# Patient Record
Sex: FEMALE | Race: WHITE | NOT HISPANIC OR LATINO | Employment: FULL TIME | URBAN - METROPOLITAN AREA
[De-identification: names, ages, dates, MRNs, and addresses within clinical notes are randomized per-mention and may not be internally consistent; named-entity substitution may affect disease eponyms.]

---

## 2019-06-21 LAB — PAP RECOMMENDATION EXT: NORMAL

## 2022-01-28 ENCOUNTER — TELEPHONE (OUTPATIENT)
Dept: RHEUMATOLOGY | Facility: CLINIC | Age: 27
End: 2022-01-28
Payer: MEDICARE

## 2022-01-28 NOTE — TELEPHONE ENCOUNTER
----- Message from Chana Fournier sent at 1/28/2022  4:14 PM CST -----  Contact: pt mother  Type: Sooner Appt Request    Caller is requesting a sooner appt.  Caller declined first available listed below.  Caller will not accept being placed on the wait list and are requesting a message be sent to the doctor.    Name of Caller:pt  mother     When is the 1st available appt:none     Symptoms:Suspected lupus     Best Call Back #: [phone]      Additional Info Pt mother states her Cardiologist wants her to make a appt with Dr Crews for suspected Lupus Please advise-Thank you~

## 2022-04-04 ENCOUNTER — TELEPHONE (OUTPATIENT)
Dept: RHEUMATOLOGY | Facility: CLINIC | Age: 27
End: 2022-04-04
Payer: MEDICAID

## 2022-04-04 NOTE — TELEPHONE ENCOUNTER
Called patients mom gave 1st available appointment patient placed on wait list.  ----- Message from Archie Núñez sent at 4/4/2022 11:13 AM CDT -----  Regarding: appointment  Contact: Mom, Felicitas Polk said she left message regarding appointment, patient schedule is flexible, please call back at [phone]     Case number [id number]

## 2022-06-07 ENCOUNTER — LAB VISIT (OUTPATIENT)
Dept: LAB | Facility: HOSPITAL | Age: 27
End: 2022-06-07
Attending: INTERNAL MEDICINE
Payer: COMMERCIAL

## 2022-06-07 ENCOUNTER — OFFICE VISIT (OUTPATIENT)
Dept: RHEUMATOLOGY | Facility: CLINIC | Age: 27
End: 2022-06-07
Payer: COMMERCIAL

## 2022-06-07 VITALS
WEIGHT: 190.69 LBS | HEART RATE: 74 BPM | SYSTOLIC BLOOD PRESSURE: 145 MMHG | BODY MASS INDEX: 29.93 KG/M2 | HEIGHT: 67 IN | DIASTOLIC BLOOD PRESSURE: 90 MMHG

## 2022-06-07 DIAGNOSIS — R21 RASH AND NONSPECIFIC SKIN ERUPTION: Primary | ICD-10-CM

## 2022-06-07 DIAGNOSIS — R21 RASH AND NONSPECIFIC SKIN ERUPTION: ICD-10-CM

## 2022-06-07 DIAGNOSIS — Z71.89 COUNSELING ON HEALTH PROMOTION AND DISEASE PREVENTION: ICD-10-CM

## 2022-06-07 DIAGNOSIS — G89.4 CHRONIC PAIN SYNDROME: ICD-10-CM

## 2022-06-07 LAB
ALBUMIN SERPL BCP-MCNC: 4.5 G/DL (ref 3.5–5.2)
ALP SERPL-CCNC: 63 U/L (ref 55–135)
ALT SERPL W/O P-5'-P-CCNC: 22 U/L (ref 10–44)
ANION GAP SERPL CALC-SCNC: 13 MMOL/L (ref 8–16)
AST SERPL-CCNC: 18 U/L (ref 10–40)
BASOPHILS # BLD AUTO: 0.01 K/UL (ref 0–0.2)
BASOPHILS NFR BLD: 0.2 % (ref 0–1.9)
BILIRUB SERPL-MCNC: 0.7 MG/DL (ref 0.1–1)
BILIRUB UR QL STRIP: NEGATIVE
BUN SERPL-MCNC: 14 MG/DL (ref 6–20)
C3 SERPL-MCNC: 135 MG/DL (ref 50–180)
C4 SERPL-MCNC: 19 MG/DL (ref 11–44)
CALCIUM SERPL-MCNC: 9.7 MG/DL (ref 8.7–10.5)
CHLORIDE SERPL-SCNC: 106 MMOL/L (ref 95–110)
CK SERPL-CCNC: 81 U/L (ref 20–180)
CLARITY UR: CLEAR
CO2 SERPL-SCNC: 22 MMOL/L (ref 23–29)
COLOR UR: YELLOW
CREAT SERPL-MCNC: 0.8 MG/DL (ref 0.5–1.4)
CREAT UR-MCNC: 134.5 MG/DL (ref 15–325)
CRP SERPL-MCNC: 2.5 MG/L (ref 0–8.2)
DIFFERENTIAL METHOD: NORMAL
EOSINOPHIL # BLD AUTO: 0.1 K/UL (ref 0–0.5)
EOSINOPHIL NFR BLD: 1.6 % (ref 0–8)
ERYTHROCYTE [DISTWIDTH] IN BLOOD BY AUTOMATED COUNT: 12.5 % (ref 11.5–14.5)
ERYTHROCYTE [SEDIMENTATION RATE] IN BLOOD BY WESTERGREN METHOD: 11 MM/HR (ref 0–20)
EST. GFR  (AFRICAN AMERICAN): >60 ML/MIN/1.73 M^2
EST. GFR  (NON AFRICAN AMERICAN): >60 ML/MIN/1.73 M^2
GLUCOSE SERPL-MCNC: 80 MG/DL (ref 70–110)
GLUCOSE UR QL STRIP: NEGATIVE
HCT VFR BLD AUTO: 40.9 % (ref 37–48.5)
HGB BLD-MCNC: 13.8 G/DL (ref 12–16)
HGB UR QL STRIP: NEGATIVE
IMM GRANULOCYTES # BLD AUTO: 0.02 K/UL (ref 0–0.04)
IMM GRANULOCYTES NFR BLD AUTO: 0.4 % (ref 0–0.5)
KETONES UR QL STRIP: NEGATIVE
LEUKOCYTE ESTERASE UR QL STRIP: NEGATIVE
LYMPHOCYTES # BLD AUTO: 2.1 K/UL (ref 1–4.8)
LYMPHOCYTES NFR BLD: 41.1 % (ref 18–48)
MCH RBC QN AUTO: 29.7 PG (ref 27–31)
MCHC RBC AUTO-ENTMCNC: 33.7 G/DL (ref 32–36)
MCV RBC AUTO: 88 FL (ref 82–98)
MONOCYTES # BLD AUTO: 0.4 K/UL (ref 0.3–1)
MONOCYTES NFR BLD: 7 % (ref 4–15)
NEUTROPHILS # BLD AUTO: 2.6 K/UL (ref 1.8–7.7)
NEUTROPHILS NFR BLD: 49.7 % (ref 38–73)
NITRITE UR QL STRIP: NEGATIVE
NRBC BLD-RTO: 0 /100 WBC
PH UR STRIP: 6 [PH] (ref 5–8)
PLATELET # BLD AUTO: 211 K/UL (ref 150–450)
PMV BLD AUTO: 10.7 FL (ref 9.2–12.9)
POTASSIUM SERPL-SCNC: 4 MMOL/L (ref 3.5–5.1)
PROT SERPL-MCNC: 7.8 G/DL (ref 6–8.4)
PROT UR QL STRIP: NEGATIVE
PROT UR-MCNC: 11 MG/DL (ref 0–15)
PROT/CREAT UR: 0.08 MG/G{CREAT} (ref 0–0.2)
RBC # BLD AUTO: 4.64 M/UL (ref 4–5.4)
SODIUM SERPL-SCNC: 141 MMOL/L (ref 136–145)
SP GR UR STRIP: 1.02 (ref 1–1.03)
URN SPEC COLLECT METH UR: NORMAL
WBC # BLD AUTO: 5.14 K/UL (ref 3.9–12.7)

## 2022-06-07 PROCEDURE — 86140 C-REACTIVE PROTEIN: CPT | Performed by: INTERNAL MEDICINE

## 2022-06-07 PROCEDURE — 81003 URINALYSIS AUTO W/O SCOPE: CPT | Performed by: INTERNAL MEDICINE

## 2022-06-07 PROCEDURE — 99999 PR PBB SHADOW E&M-EST. PATIENT-LVL III: ICD-10-PCS | Mod: PBBFAC,,, | Performed by: INTERNAL MEDICINE

## 2022-06-07 PROCEDURE — 86146 BETA-2 GLYCOPROTEIN ANTIBODY: CPT | Performed by: INTERNAL MEDICINE

## 2022-06-07 PROCEDURE — 86225 DNA ANTIBODY NATIVE: CPT | Performed by: INTERNAL MEDICINE

## 2022-06-07 PROCEDURE — 99204 PR OFFICE/OUTPT VISIT, NEW, LEVL IV, 45-59 MIN: ICD-10-PCS | Mod: ICN,S$GLB,, | Performed by: INTERNAL MEDICINE

## 2022-06-07 PROCEDURE — 82570 ASSAY OF URINE CREATININE: CPT | Performed by: INTERNAL MEDICINE

## 2022-06-07 PROCEDURE — 82550 ASSAY OF CK (CPK): CPT | Performed by: INTERNAL MEDICINE

## 2022-06-07 PROCEDURE — 83516 IMMUNOASSAY NONANTIBODY: CPT | Mod: 59 | Performed by: INTERNAL MEDICINE

## 2022-06-07 PROCEDURE — 36415 COLL VENOUS BLD VENIPUNCTURE: CPT | Mod: PO | Performed by: INTERNAL MEDICINE

## 2022-06-07 PROCEDURE — 86160 COMPLEMENT ANTIGEN: CPT | Mod: 59 | Performed by: INTERNAL MEDICINE

## 2022-06-07 PROCEDURE — 86038 ANTINUCLEAR ANTIBODIES: CPT | Performed by: INTERNAL MEDICINE

## 2022-06-07 PROCEDURE — 85651 RBC SED RATE NONAUTOMATED: CPT | Performed by: INTERNAL MEDICINE

## 2022-06-07 PROCEDURE — 99204 OFFICE O/P NEW MOD 45 MIN: CPT | Mod: ICN,S$GLB,, | Performed by: INTERNAL MEDICINE

## 2022-06-07 PROCEDURE — 86431 RHEUMATOID FACTOR QUANT: CPT | Performed by: INTERNAL MEDICINE

## 2022-06-07 PROCEDURE — 86147 CARDIOLIPIN ANTIBODY EA IG: CPT | Mod: 59 | Performed by: INTERNAL MEDICINE

## 2022-06-07 PROCEDURE — 86235 NUCLEAR ANTIGEN ANTIBODY: CPT | Mod: 59 | Performed by: INTERNAL MEDICINE

## 2022-06-07 PROCEDURE — 99999 PR PBB SHADOW E&M-EST. PATIENT-LVL III: CPT | Mod: PBBFAC,,, | Performed by: INTERNAL MEDICINE

## 2022-06-07 PROCEDURE — 85025 COMPLETE CBC W/AUTO DIFF WBC: CPT | Performed by: INTERNAL MEDICINE

## 2022-06-07 PROCEDURE — 86200 CCP ANTIBODY: CPT | Performed by: INTERNAL MEDICINE

## 2022-06-07 PROCEDURE — 99213 OFFICE O/P EST LOW 20 MIN: CPT | Mod: PBBFAC,PO | Performed by: INTERNAL MEDICINE

## 2022-06-07 PROCEDURE — 80053 COMPREHEN METABOLIC PANEL: CPT | Performed by: INTERNAL MEDICINE

## 2022-06-07 PROCEDURE — 86160 COMPLEMENT ANTIGEN: CPT | Performed by: INTERNAL MEDICINE

## 2022-06-07 PROCEDURE — 85610 PROTHROMBIN TIME: CPT | Performed by: INTERNAL MEDICINE

## 2022-06-07 RX ORDER — HYDROXYCHLOROQUINE SULFATE 200 MG/1
200 TABLET, FILM COATED ORAL 2 TIMES DAILY
Qty: 60 TABLET | Refills: 3 | Status: SHIPPED | OUTPATIENT
Start: 2022-06-07 | End: 2022-07-19

## 2022-06-07 NOTE — PROGRESS NOTES
RHEUMATOLOGY OUTPATIENT CLINIC NOTE    6/7/2022    Attending Rheumatologist: Emerson Polanco  Primary Care Provider/Physician Requesting Consultation: Alfred Burgess MD   Chief Complaint/Reason For Consultation:  Possible Lupus      Subjective:     Brennan Christine is a 27 y.o. White female with intermittent hive like lesions referred for rheumatic evaluation.    Main concern of chronic arthralgias, intermittent rashes, and dyspnea on exertion.    Review of Systems   Constitutional: Positive for malaise/fatigue. Negative for fever.   HENT:        Chronic intermittent pain full oral mucosa ulcerations.  Moderate sicca symptoms   Eyes:        No history uveitis   Respiratory: Negative for cough, hemoptysis and shortness of breath (Refers chronic dyspnea on exertion).    Gastrointestinal: Negative for blood in stool, heartburn and melena.        Denies dysphagia.   Genitourinary:        Denies ulcerative lesions   Musculoskeletal: Positive for joint pain and myalgias.   Skin: Negative for rash.        Denies personal or family history of psoriasis (immediate family).  Denies Raynaud's phenomena.  Denies pathergy phenomena.  Intermittent erythematous plaque on sun-exposed areas (pictures provided by patient) that last over 24 hours, more itching done burning, heal without a scar.   Neurological: Positive for headaches. Negative for focal weakness.   Endo/Heme/Allergies:        History of superficial venous thrombosis in setting of venous malformation left upper extremity.  Denies ever being pregnant.       Chronic comorbid conditions affecting medical decision making today:  Past Medical History:   Diagnosis Date    Chronic back pain     Venous malformation     Left Arm     Past Surgical History:   Procedure Laterality Date    SCLEROTHERAPY WITH ULTRASOUND GUIDANCE      TONSILLECTOMY       Family History   Problem Relation Age of Onset    Hypertension Mother     Diabetes Mellitus Father     Hypertension Maternal  Grandmother     Hypertension Maternal Grandfather     Coronary artery disease Maternal Grandfather     Kidney disease Maternal Grandfather      Social History     Substance and Sexual Activity   Alcohol Use No     Social History     Tobacco Use   Smoking Status Current Every Day Smoker    Packs/day: 0.50    Types: Cigarettes   Smokeless Tobacco Not on file     Social History     Substance and Sexual Activity   Drug Use No     No current outpatient medications on file.     Objective:     Vitals:    06/07/22 0806   BP: (!) 145/90   Pulse: 74     Physical Exam   Eyes: Conjunctivae are normal.   Pulmonary/Chest: Effort normal. No respiratory distress.   Musculoskeletal:         General: No swelling. Normal range of motion.   Neurological: She displays no weakness.   Skin: No rash noted.       Reviewed available old and all outside pertinent medical records available.    All lab results personally reviewed and interpreted by me.  Lab Results   Component Value Date    WBC 10.70 04/25/2016    HGB 13.9 04/25/2016    HCT 39.9 04/25/2016    MCV 91 04/25/2016    RDW 12.6 04/25/2016     04/25/2016       Lab Results   Component Value Date     04/25/2016    K 4.0 04/25/2016     04/25/2016    CO2 25 04/25/2016    GLU 83 04/25/2016    BUN 14 04/25/2016    CALCIUM 8.9 04/25/2016    PROT 7.0 04/25/2016    ALBUMIN 4.3 04/25/2016    BILITOT 0.4 04/25/2016    AST 22 04/25/2016    ALKPHOS 53 04/25/2016    ALT 30 04/25/2016       Lab Results   Component Value Date    COLORU Yellow 04/25/2016    APPEARANCEUA Hazy (A) 04/25/2016    SPECGRAV 1.025 04/25/2016    PHUR 6.0 04/25/2016    PROTEINUA Negative 04/25/2016    KETONESU Negative 04/25/2016    LEUKOCYTESUR 1+ (A) 04/25/2016    NITRITE Negative 04/25/2016    UROBILINOGEN Negative 04/25/2016       No results found for: PTH    No results found for: URICACID    No results found for: CRP, ERYTHROCYTES    No results found for: ANATITER    No components found for:  TSPOTTB,  QUANTIFERON     ASSESSMENT:     Chronic intermittent rash and nonspecific symptoms (fatigue, APPIAH, myofascial pain) referred for rheumatic evaluation with concern of systemic rheumatic autoimmune condition.  Consider undifferentiated connective tissue disease with features of early incomplete SLE, Sjogren syndrome, or dermatomyositis.  Recommend based rheumatic evaluation to determine presence of autoantibodies and related end organ damage.  For chronic intermittent rash on sun-exposed areas recommend trial of Plaquenil for 4-6 months.  No history of QT prolongation.  If no improvement, recommend follow-up with Dermatology to consider skin biopsy a determine etiology of rash.    PLAN:     1. Chronic intermittent photosensitive rash    2. Chronic pain syndrome    3. Other specified counseling      Disclaimer: This note is prepared using voice recognition software and as such is likely to have errors and has not been proof read. Please contact me for questions.         Emerson Polanco M.D.

## 2022-06-08 ENCOUNTER — OFFICE VISIT (OUTPATIENT)
Dept: FAMILY MEDICINE | Facility: CLINIC | Age: 27
End: 2022-06-08
Payer: COMMERCIAL

## 2022-06-08 ENCOUNTER — HOSPITAL ENCOUNTER (OUTPATIENT)
Dept: RADIOLOGY | Facility: CLINIC | Age: 27
Discharge: HOME | End: 2022-06-08
Attending: STUDENT IN AN ORGANIZED HEALTH CARE EDUCATION/TRAINING PROGRAM
Payer: COMMERCIAL

## 2022-06-08 VITALS
RESPIRATION RATE: 18 BRPM | HEART RATE: 72 BPM | HEIGHT: 67 IN | DIASTOLIC BLOOD PRESSURE: 74 MMHG | BODY MASS INDEX: 30.27 KG/M2 | SYSTOLIC BLOOD PRESSURE: 110 MMHG | WEIGHT: 192.88 LBS | OXYGEN SATURATION: 97 %

## 2022-06-08 DIAGNOSIS — R05.3 CHRONIC COUGH: ICD-10-CM

## 2022-06-08 DIAGNOSIS — E04.1 THYROID NODULE: ICD-10-CM

## 2022-06-08 DIAGNOSIS — Z13.220 ENCOUNTER FOR LIPID SCREENING FOR CARDIOVASCULAR DISEASE: ICD-10-CM

## 2022-06-08 DIAGNOSIS — Z13.6 ENCOUNTER FOR LIPID SCREENING FOR CARDIOVASCULAR DISEASE: ICD-10-CM

## 2022-06-08 DIAGNOSIS — Z00.00 ROUTINE GENERAL MEDICAL EXAMINATION AT A HEALTH CARE FACILITY: Primary | ICD-10-CM

## 2022-06-08 LAB
CCP AB SER IA-ACNC: <0.5 U/ML
DSDNA AB SER-ACNC: NORMAL [IU]/ML
RHEUMATOID FACT SERPL-ACNC: <13 IU/ML (ref 0–15)

## 2022-06-08 PROCEDURE — 71046 X-RAY EXAM CHEST 2 VIEWS: CPT | Mod: TC,FY,PO

## 2022-06-08 PROCEDURE — 99999 PR PBB SHADOW E&M-EST. PATIENT-LVL IV: ICD-10-PCS | Mod: PBBFAC,,, | Performed by: STUDENT IN AN ORGANIZED HEALTH CARE EDUCATION/TRAINING PROGRAM

## 2022-06-08 PROCEDURE — 99999 PR PBB SHADOW E&M-EST. PATIENT-LVL IV: CPT | Mod: PBBFAC,,, | Performed by: STUDENT IN AN ORGANIZED HEALTH CARE EDUCATION/TRAINING PROGRAM

## 2022-06-08 PROCEDURE — 99213 OFFICE O/P EST LOW 20 MIN: CPT | Mod: S$GLB,,, | Performed by: STUDENT IN AN ORGANIZED HEALTH CARE EDUCATION/TRAINING PROGRAM

## 2022-06-08 PROCEDURE — 99213 PR OFFICE/OUTPT VISIT, EST, LEVL III, 20-29 MIN: ICD-10-PCS | Mod: S$GLB,,, | Performed by: STUDENT IN AN ORGANIZED HEALTH CARE EDUCATION/TRAINING PROGRAM

## 2022-06-08 PROCEDURE — 99214 OFFICE O/P EST MOD 30 MIN: CPT | Mod: PBBFAC,25,PO | Performed by: STUDENT IN AN ORGANIZED HEALTH CARE EDUCATION/TRAINING PROGRAM

## 2022-06-08 PROCEDURE — 71046 XR CHEST PA AND LATERAL: ICD-10-PCS | Mod: 26,,, | Performed by: RADIOLOGY

## 2022-06-08 PROCEDURE — 71046 X-RAY EXAM CHEST 2 VIEWS: CPT | Mod: 26,,, | Performed by: RADIOLOGY

## 2022-06-08 NOTE — PROGRESS NOTES
[facility] MEDICINE CLINIC NOTE    Patient Name: Brennan Christine  YOB: 1995    PRESENTING HISTORY   Chief Complaint:   Chief Complaint   Patient presents with    Chest Pain    Fatigue    Shortness of Breath        History of Present Illness:  Ms. Brennan Christine is a 27 y.o. female     Several year history of symptoms. Probably 3-5 years. Cough, Chest pain last year in October.   Saw Rheumatology yesterday   Saw Cardiologist after CP  Saw Dermatology for rash.     See ROS below.     PMHx: vascular malformation left forearm.   Surg: sclerotherapy on arm. T and A.   Fhx: HTN, CKD MGF, CAD, DM2, HLD. No cancers. M aunt with RF, m aunt with Sjogrens                                           Review of Systems   Constitutional: Positive for malaise/fatigue. Negative for weight loss.   Respiratory: Positive for cough (non-productive), shortness of breath and wheezing.    Cardiovascular: Positive for palpitations (with deep inspiration) and leg swelling.   Skin: Positive for rash (coalesces).   Neurological: Positive for headaches.         PAST HISTORY:     Past Medical History:   Diagnosis Date    Chronic back pain     Venous malformation     Left Arm       Past Surgical History:   Procedure Laterality Date    SCLEROTHERAPY WITH ULTRASOUND GUIDANCE      TONSILLECTOMY         Family History   Problem Relation Age of Onset    Hypertension Mother     Diabetes Mellitus Father     Hypertension Maternal Grandmother     Hypertension Maternal Grandfather     Coronary artery disease Maternal Grandfather     Kidney disease Maternal Grandfather        Social History     Socioeconomic History    Marital status: Single   Tobacco Use    Smoking status: Former Smoker     Packs/day: 0.50     Types: Cigarettes     Quit date: 2020     Years since quittin.9    Smokeless tobacco: Never Used   Substance and Sexual Activity    Alcohol use: No    Drug use: No       MEDICATIONS & ALLERGIES:  "    Current Outpatient Medications on File Prior to Visit   Medication Sig    levonorgestrel (MIRENA IU) Mirena Take No date recorded No form recorded No frequency recorded No route recorded No set duration recorded No set duration amount recorded suspended No dosage strength recorded No dosage strength units of measure recorded    hydrOXYchloroQUINE (PLAQUENIL) 200 mg tablet Take 1 tablet (200 mg total) by mouth 2 (two) times daily. (Patient not taking: Reported on 6/8/2022)     No current facility-administered medications on file prior to visit.       Review of patient's allergies indicates:   Allergen Reactions    Metoclopramide Nausea And Vomiting, Other (See Comments) and Shortness Of Breath    Ceclor [cefaclor]     Benzphetamine Nausea And Vomiting and Other (See Comments)    Sulfamethoxazole-trimethoprim Itching, Nausea And Vomiting, Other (See Comments) and Rash       OBJECTIVE:   Vital Signs:  Vitals:    06/08/22 1523   BP: 110/74   Pulse: 72   Resp: 18   SpO2: 97%   Weight: 87.5 kg (192 lb 14.4 oz)   Height: 5' 7" (1.702 m)       No results found for this or any previous visit (from the past 24 hour(s)).      Physical Exam  Vitals and nursing note reviewed.   Constitutional:       Appearance: She is not diaphoretic.   HENT:      Head: Normocephalic and atraumatic.      Right Ear: External ear normal.      Left Ear: External ear normal.   Eyes:      General: No scleral icterus.     Conjunctiva/sclera: Conjunctivae normal.      Pupils: Pupils are equal, round, and reactive to light.   Neck:      Thyroid: No thyromegaly.      Comments: Nodule right lobe of thyroid.   Cardiovascular:      Rate and Rhythm: Normal rate and regular rhythm.      Heart sounds: Normal heart sounds. No murmur heard.     Comments: Chronic AV malformation left arm.   Pulmonary:      Effort: Pulmonary effort is normal. No respiratory distress.      Breath sounds: Normal breath sounds. No wheezing or rales.   Musculoskeletal:       "   General: No tenderness or deformity. Normal range of motion.      Cervical back: Normal range of motion and neck supple.      Right lower leg: Edema (trace, non-pitting) present.      Left lower leg: Edema present.   Lymphadenopathy:      Cervical: No cervical adenopathy.   Skin:     General: Skin is warm and dry.      Findings: No erythema or rash.   Neurological:      Mental Status: She is alert and oriented to person, place, and time.      Gait: Gait is intact.   Psychiatric:         Mood and Affect: Mood and affect normal.         Cognition and Memory: Memory normal.         Judgment: Judgment normal.         ASSESSMENT & PLAN:     Thyroid nodule  -     TSH; Future; Expected date: 06/08/2022  -     THYROID PEROXIDASE ANTIBODY; Future; Expected date: 06/08/2022  -     US Soft Tissue Head Neck Thyroid; Future; Expected date: 06/08/2022    Chronic cough  -     X-Ray Chest PA And Lateral; Future; Expected date: 06/08/2022  -     ANGIOTENSIN CONVERTING ENZYME; Future; Expected date: 06/08/2022    Encounter for lipid screening for cardiovascular disease  -     Lipid Panel; Future; Expected date: 06/08/2022    Routine general medical examination at a health care facility  -     HIV 1/2 Ag/Ab (4th Gen); Future; Expected date: 06/08/2022  -     HEPATITIS C ANTIBODY; Future; Expected date: 06/08/2022       Unclear etiology of symptoms.     Incidental thyroid nodule    Investigate    Chetan Bnauelos MD   Internal Medicine    This note was created using Dragon voice recognition software that occasionally misinterprets phrases or words.

## 2022-06-09 LAB — ANA SER QL IF: NORMAL

## 2022-06-10 LAB
B2 GLYCOPROT1 IGA SER QL: <9 SAU
B2 GLYCOPROT1 IGG SER QL: <9 SGU
B2 GLYCOPROT1 IGM SER QL: <9 SMU
CARDIOLIPIN IGG SER IA-ACNC: <9.4 GPL (ref 0–14.99)
CARDIOLIPIN IGM SER IA-ACNC: 10.3 MPL (ref 0–12.49)

## 2022-06-13 LAB
APTT IMM NP PPP: NORMAL SEC (ref 32–48)
APTT P HEP NEUT PPP: NORMAL SEC (ref 32–48)
CONFIRM APTT STACLOT: NORMAL
DRVVT SCREEN TO CONFIRM RATIO: NORMAL RATIO
LA 3 SCREEN W REFLEX-IMP: NORMAL
LA NT DPL PPP QL: NORMAL
MIXING DRVVT: NORMAL SEC (ref 33–44)
PROTHROMBIN TIME: 13.2 SEC (ref 12–15.5)
REPTILASE TIME: NORMAL SEC
SCREEN APTT: 42 SEC (ref 32–48)
SCREEN DRVVT: 39 SEC (ref 33–44)
THROMBIN TIME: NORMAL SEC (ref 14.7–19.5)

## 2022-06-16 ENCOUNTER — HOSPITAL ENCOUNTER (OUTPATIENT)
Dept: RADIOLOGY | Facility: HOSPITAL | Age: 27
Discharge: HOME | End: 2022-06-16
Attending: STUDENT IN AN ORGANIZED HEALTH CARE EDUCATION/TRAINING PROGRAM
Payer: COMMERCIAL

## 2022-06-16 DIAGNOSIS — E04.1 THYROID NODULE: ICD-10-CM

## 2022-06-16 PROCEDURE — 76536 US EXAM OF HEAD AND NECK: CPT | Mod: 26,,, | Performed by: RADIOLOGY

## 2022-06-16 PROCEDURE — 76536 US EXAM OF HEAD AND NECK: CPT | Mod: TC

## 2022-06-16 PROCEDURE — 76536 US SOFT TISSUE HEAD NECK THYROID: ICD-10-PCS | Mod: 26,,, | Performed by: RADIOLOGY

## 2022-06-17 ENCOUNTER — PATIENT MESSAGE (OUTPATIENT)
Dept: FAMILY MEDICINE | Facility: CLINIC | Age: 27
End: 2022-06-17
Payer: COMMERCIAL

## 2022-06-17 DIAGNOSIS — E04.1 THYROID NODULE: Primary | ICD-10-CM

## 2022-06-21 ENCOUNTER — TELEPHONE (OUTPATIENT)
Dept: FAMILY MEDICINE | Facility: CLINIC | Age: 27
End: 2022-06-21
Payer: COMMERCIAL

## 2022-06-21 NOTE — TELEPHONE ENCOUNTER
----- Message from Fátima Feldman sent at 6/21/2022  8:50 AM CDT -----  Contact: pt  Type: Return Call    Who Called: pt  Who Left Message for Pt: nurse  Does the patient know what this is regarding: yes  Best Call Back Number: [phone]    Thank you~

## 2022-06-21 NOTE — TELEPHONE ENCOUNTER
Call placed to patient. Call answered by patient's mother (Felicitas) who states patient has scheduled FNA of Thyroid for the date of 7-13-22.

## 2022-06-23 LAB
ANTI-PM/SCL AB: <20 UNITS
ANTI-SS-A 52 KD AB, IGG: <20 UNITS
EJ AB SER QL: NEGATIVE
ENA JO1 AB SER IA-ACNC: <20 UNITS
ENA SM+RNP AB SER IA-ACNC: <20 UNITS
FIBRILLARIN (U3 RNP): NEGATIVE
KU AB SER QL: NEGATIVE
MDA-5 (P140): <20 UNITS
MI2 AB SER QL: NEGATIVE
NXP-2 (P140): <20 UNITS
OJ AB SER QL: NEGATIVE
PL12 AB SER QL: NEGATIVE
PL7 AB SER QL: NEGATIVE
SRP AB SERPL QL: NEGATIVE
TIF1 GAMMA (P155/140): <20 UNITS
U2 SNRNP: NEGATIVE

## 2022-06-28 DIAGNOSIS — E04.1 THYROID NODULE: Primary | ICD-10-CM

## 2022-07-06 ENCOUNTER — TELEPHONE (OUTPATIENT)
Dept: FAMILY MEDICINE | Facility: CLINIC | Age: 27
End: 2022-07-06
Payer: COMMERCIAL

## 2022-07-06 DIAGNOSIS — E04.1 THYROID NODULE: Primary | ICD-10-CM

## 2022-07-07 ENCOUNTER — HOSPITAL ENCOUNTER (OUTPATIENT)
Dept: RADIOLOGY | Facility: HOSPITAL | Age: 27
Discharge: HOME | End: 2022-07-07
Attending: STUDENT IN AN ORGANIZED HEALTH CARE EDUCATION/TRAINING PROGRAM
Payer: COMMERCIAL

## 2022-07-07 DIAGNOSIS — E04.1 THYROID NODULE: ICD-10-CM

## 2022-07-07 PROCEDURE — 88173 PR  INTERPRETATION OF FNA SMEAR: ICD-10-PCS | Mod: 26,,, | Performed by: STUDENT IN AN ORGANIZED HEALTH CARE EDUCATION/TRAINING PROGRAM

## 2022-07-07 PROCEDURE — 10005 FNA BX W/US GDN 1ST LES: CPT

## 2022-07-07 PROCEDURE — 88173 CYTOPATH EVAL FNA REPORT: CPT | Mod: 26,,, | Performed by: STUDENT IN AN ORGANIZED HEALTH CARE EDUCATION/TRAINING PROGRAM

## 2022-07-07 PROCEDURE — 10005 FNA BX W/US GDN 1ST LES: CPT | Mod: ,,, | Performed by: RADIOLOGY

## 2022-07-07 PROCEDURE — 88173 CYTOPATH EVAL FNA REPORT: CPT | Performed by: STUDENT IN AN ORGANIZED HEALTH CARE EDUCATION/TRAINING PROGRAM

## 2022-07-07 PROCEDURE — 10005 US FINE NEEDLE ASPIRATION THYROID, FIRST LESION: ICD-10-PCS | Mod: ,,, | Performed by: RADIOLOGY

## 2022-07-07 PROCEDURE — 25000003 PHARM REV CODE 250: Performed by: STUDENT IN AN ORGANIZED HEALTH CARE EDUCATION/TRAINING PROGRAM

## 2022-07-07 RX ORDER — LIDOCAINE HYDROCHLORIDE 10 MG/ML
5 INJECTION, SOLUTION EPIDURAL; INFILTRATION; INTRACAUDAL; PERINEURAL ONCE
Status: COMPLETED | OUTPATIENT
Start: 2022-07-07 | End: 2022-07-07

## 2022-07-07 RX ADMIN — LIDOCAINE HYDROCHLORIDE 50 MG: 10 INJECTION, SOLUTION EPIDURAL; INFILTRATION; INTRACAUDAL at 02:07

## 2022-07-12 ENCOUNTER — PATIENT MESSAGE (OUTPATIENT)
Dept: FAMILY MEDICINE | Facility: CLINIC | Age: 27
End: 2022-07-12
Payer: COMMERCIAL

## 2022-07-13 ENCOUNTER — PATIENT MESSAGE (OUTPATIENT)
Dept: FAMILY MEDICINE | Facility: CLINIC | Age: 27
End: 2022-07-13
Payer: COMMERCIAL

## 2022-07-13 ENCOUNTER — PATIENT MESSAGE (OUTPATIENT)
Dept: OTOLARYNGOLOGY | Facility: CLINIC | Age: 27
End: 2022-07-13
Payer: COMMERCIAL

## 2022-07-13 DIAGNOSIS — C73 PAPILLARY THYROID CARCINOMA: Primary | ICD-10-CM

## 2022-07-13 LAB
COMMENT: ABNORMAL
FINAL PATHOLOGIC DIAGNOSIS: ABNORMAL
Lab: ABNORMAL

## 2022-07-14 ENCOUNTER — OFFICE VISIT (OUTPATIENT)
Dept: SURGERY | Facility: CLINIC | Age: 27
End: 2022-07-14
Payer: COMMERCIAL

## 2022-07-14 ENCOUNTER — TELEPHONE (OUTPATIENT)
Dept: ENDOCRINOLOGY | Facility: CLINIC | Age: 27
End: 2022-07-14
Payer: COMMERCIAL

## 2022-07-14 ENCOUNTER — PATIENT MESSAGE (OUTPATIENT)
Dept: FAMILY MEDICINE | Facility: CLINIC | Age: 27
End: 2022-07-14
Payer: COMMERCIAL

## 2022-07-14 ENCOUNTER — PATIENT MESSAGE (OUTPATIENT)
Dept: SURGERY | Facility: CLINIC | Age: 27
End: 2022-07-14
Payer: COMMERCIAL

## 2022-07-14 VITALS
BODY MASS INDEX: 30 KG/M2 | WEIGHT: 191.13 LBS | HEART RATE: 82 BPM | TEMPERATURE: 98 F | DIASTOLIC BLOOD PRESSURE: 75 MMHG | RESPIRATION RATE: 16 BRPM | SYSTOLIC BLOOD PRESSURE: 116 MMHG | HEIGHT: 67 IN

## 2022-07-14 DIAGNOSIS — C73 PAPILLARY THYROID CARCINOMA: ICD-10-CM

## 2022-07-14 PROCEDURE — 3074F PR MOST RECENT SYSTOLIC BLOOD PRESSURE < 130 MM HG: ICD-10-PCS | Mod: CPTII,S$GLB,, | Performed by: STUDENT IN AN ORGANIZED HEALTH CARE EDUCATION/TRAINING PROGRAM

## 2022-07-14 PROCEDURE — 3074F SYST BP LT 130 MM HG: CPT | Mod: CPTII,S$GLB,, | Performed by: STUDENT IN AN ORGANIZED HEALTH CARE EDUCATION/TRAINING PROGRAM

## 2022-07-14 PROCEDURE — 1159F MED LIST DOCD IN RCRD: CPT | Mod: CPTII,S$GLB,, | Performed by: STUDENT IN AN ORGANIZED HEALTH CARE EDUCATION/TRAINING PROGRAM

## 2022-07-14 PROCEDURE — 3008F PR BODY MASS INDEX (BMI) DOCUMENTED: ICD-10-PCS | Mod: CPTII,S$GLB,, | Performed by: STUDENT IN AN ORGANIZED HEALTH CARE EDUCATION/TRAINING PROGRAM

## 2022-07-14 PROCEDURE — 3078F DIAST BP <80 MM HG: CPT | Mod: CPTII,S$GLB,, | Performed by: STUDENT IN AN ORGANIZED HEALTH CARE EDUCATION/TRAINING PROGRAM

## 2022-07-14 PROCEDURE — 3078F PR MOST RECENT DIASTOLIC BLOOD PRESSURE < 80 MM HG: ICD-10-PCS | Mod: CPTII,S$GLB,, | Performed by: STUDENT IN AN ORGANIZED HEALTH CARE EDUCATION/TRAINING PROGRAM

## 2022-07-14 PROCEDURE — 99999 PR PBB SHADOW E&M-EST. PATIENT-LVL III: CPT | Mod: PBBFAC,,, | Performed by: STUDENT IN AN ORGANIZED HEALTH CARE EDUCATION/TRAINING PROGRAM

## 2022-07-14 PROCEDURE — 3008F BODY MASS INDEX DOCD: CPT | Mod: CPTII,S$GLB,, | Performed by: STUDENT IN AN ORGANIZED HEALTH CARE EDUCATION/TRAINING PROGRAM

## 2022-07-14 PROCEDURE — 99205 PR OFFICE/OUTPT VISIT, NEW, LEVL V, 60-74 MIN: ICD-10-PCS | Mod: S$GLB,,, | Performed by: STUDENT IN AN ORGANIZED HEALTH CARE EDUCATION/TRAINING PROGRAM

## 2022-07-14 PROCEDURE — 1159F PR MEDICATION LIST DOCUMENTED IN MEDICAL RECORD: ICD-10-PCS | Mod: CPTII,S$GLB,, | Performed by: STUDENT IN AN ORGANIZED HEALTH CARE EDUCATION/TRAINING PROGRAM

## 2022-07-14 PROCEDURE — 99999 PR PBB SHADOW E&M-EST. PATIENT-LVL III: ICD-10-PCS | Mod: PBBFAC,,, | Performed by: STUDENT IN AN ORGANIZED HEALTH CARE EDUCATION/TRAINING PROGRAM

## 2022-07-14 PROCEDURE — 99205 OFFICE O/P NEW HI 60 MIN: CPT | Mod: S$GLB,,, | Performed by: STUDENT IN AN ORGANIZED HEALTH CARE EDUCATION/TRAINING PROGRAM

## 2022-07-14 NOTE — TELEPHONE ENCOUNTER
Spoke with Ms. Christine & rescheduled an appt. on 9/26/22 with DAREK Faith PA-C & placed on the wait list. I initially offered 7/19/22 with  DAREK Faith PA-C but they have an appt. with   Iglesia Vale MD [facility]

## 2022-07-14 NOTE — TELEPHONE ENCOUNTER
----- Message from Sascha Jensen MD sent at 7/14/2022 11:06 AM CDT -----  Tanya Estrada and Raina.   This patient has an ppt scheduled with me for November which is rather far of. Kindly make her getting a new patient appt a priority so ensure she is on the cancellation list so she can get in to see either me on the first new patient slot that becomes available or if she is okay with it, Dr Patel or Yanna.  Thanks    Sascha Jensen MD  ----- Message -----  From: Chetan Banuelos MD  Sent: 7/14/2022   7:44 AM CDT  To: Julita Iraheta DNP, NP, Brian Patel MD, #    Good morning,     Ms Christine has a new thyroid cancer diagnosis. She has an appointment to establish a ways out. Can anyone see her sooner? She is seeing general surgery today.     Thanks,   Chetan

## 2022-07-14 NOTE — PROGRESS NOTES
Consult Note  General Surgery    Visit Diagnosis:  Right papillary thyroid carcinoma    SUBJECTIVE:     Brennan Christine is a 27 y.o. female seen at the request of Dr. Chetan Banuelos today in the Surgery Clinic for evaluation of papillary thyroid carcinoma. Her history dates back to 6/8 when on routine physical exam a thyroid nodule was palpated. Subsequent evaluation included neck ultrasound and fine needle aspiration. Brennan Christine complains of fatigue, unexplained weight changes and palpable neck mass.  She does not have a history of head and neck radiation therapy. She does not have a family history of thyroid disease. She does not have a family history of endocrinopathies. She is not taking thyroid supplementation.. She has not undergone radioactive iodine treatment.  She does have a close friend that she grew up with that had papillary thyroid cancer diagnosed a few years ago was well.      Review of patient's allergies indicates:   Allergen Reactions    Metoclopramide Nausea And Vomiting, Other (See Comments) and Shortness Of Breath    Ceclor [cefaclor]     Niacin Hives    Benzphetamine Nausea And Vomiting and Other (See Comments)    Sulfamethoxazole-trimethoprim Itching, Nausea And Vomiting, Other (See Comments) and Rash       Current Outpatient Medications   Medication Sig Dispense Refill    levonorgestrel (MIRENA IU) Mirena Take No date recorded No form recorded No frequency recorded No route recorded No set duration recorded No set duration amount recorded suspended No dosage strength recorded No dosage strength units of measure recorded      hydrOXYchloroQUINE (PLAQUENIL) 200 mg tablet Take 1 tablet (200 mg total) by mouth 2 (two) times daily. (Patient not taking: No sig reported) 60 tablet 3     No current facility-administered medications for this visit.       Past Medical History:   Diagnosis Date    Chronic back pain     Venous malformation     Left Arm     Past Surgical History:  "  Procedure Laterality Date    SCLEROTHERAPY WITH ULTRASOUND GUIDANCE      TONSILLECTOMY       Social History     Tobacco Use    Smoking status: Former Smoker     Packs/day: 0.50     Types: Cigarettes     Quit date: 2020     Years since quittin.0    Smokeless tobacco: Never Used   Substance Use Topics    Alcohol use: No    Drug use: No          Review of Systems:    Review of Systems   Constitutional: Positive for weight gain and fatigue.   HEENT: Negative.    Respiratory: Negative.    Cardiovascular: Positive for palpitations.   Genitourinary: Negative.    Endocrine: endocrine negative   Musculoskeletal: Positive for myalgias.   Skin: Positive for pruritis.   Gastrointestinal: Negative.    Hematological: Negative.          OBJECTIVE:     Vital Signs:  /75 (BP Location: Right arm, Patient Position: Sitting, BP Method: Large (Automatic))   Pulse 82   Temp 98.1 °F (36.7 °C)   Resp 16   Ht 5' 7" (1.702 m)   Wt 86.7 kg (191 lb 2.2 oz)   BMI 29.94 kg/m²    Body mass index is 29.94 kg/m².      Physical Exam:    General:  no distress, see vitals for BMI    Eyes:  conjunctivae/corneas clear   Neck: trachea midline and symmetric, No clear adenopathy   Thyroid:  thyroid As a rather large mass on the right.  This moves with swallowing with the thyroid gland.   Lung: clear to auscultation bilaterally   Heart:  regular rate and rhythm   Abdomen: soft, non-tender; bowel sounds normal; no masses,  no organomegaly   Skin/Extremities: warm and well-perfused   Pulses: 2+ and symmetric   Neuro: normal without focal findings and mental status, speech normal, alert and oriented x3       Laboratory/Radiologic Studies    Studies:  Date:       Results:         Ultrasound:  22 FINDINGS:  The right thyroid measures 5.3 x 2.6 x 2.5 cm for a calculated volume of 18 cc.  The left thyroid measures 4.3 x 1.1 x 1.4 cm for a calculated volume of 3.6 cc and a total thyroid volume of 21.6 cc.     On the right the mid " and lower portion of the thyroid is occupied by a 3.7 x 2.7 cm irregular heterogeneous mass with calcifications.  This does meet criteria for ultrasound-directed fine needle aspiration.  A 2nd small nodule measuring 9 mm is identified inferiorly and posteriorly to the main mass.  There is a 6 mm nodule just above the isthmus.  Multiple lymph nodes inferior to the left lobe and isthmus of the thyroid are noted the largest measuring 1.1 x 0.8 cm.     On the left a a a nodule within the thyroid parenchyma is not identified.     Impression:     There is a 3.7 cm irregular mass of the mid and lower pole of the right thyroid meeting criteria for ultrasound-directed fine needle aspiration.  A 2nd mm nodule is seen in the lower pole of the right thyroid.  Several lymph nodes are noted surrounding the left lobe and thyroid isthmus the largest measuring 1.1 x 0.8 cm.  Overall the thyroid is mildly enlarged   Pathology::  7/7 Papillary thyroid carcinoma              Component Value Date    TSH 1.298 06/08/2022    Sodium 141 06/07/2022    Potassium 4.0 06/07/2022    Chloride 106 06/07/2022    CO2 22 (L) 06/07/2022    Glucose 80 06/07/2022    BUN 14 06/07/2022    Creatinine 0.8 06/07/2022    Calcium 9.7 06/07/2022    Anion Gap 13 06/07/2022    eGFR if African American >60 06/07/2022    eGFR if non African American >60 06/07/2022       ASSESSMENT/PLAN:       Assessment    Ms. Christine is a 27 y.o. female who presents with evidence of papillary thyroid carcinoma.  She presented with a myriad of symptoms that were felt to be more rheumatologic in nature.  Her PCP found a nodule on the right thyroid lobe which prompted imaging and biopsy.  Biopsy returned as papillary thyroid carcinoma.    Plan    During this visit, lab and imaging results were reviewed with the patient and her mother. Thyroid anatomy and physiology were reviewed. The above testing and examination support the diagnosis of thyroid carcinoma.    Given the size of the  lesion at 3.7 cm, would recommend proceeding with a total thyroidectomy.  There was mention of multiple lymph nodes visualized on the contralateral side on the ultrasound.  She does not have any clearly palpable adenopathy on the right.  I did discuss that there is a possibility of an ipsilateral siobhan dissection pending operative findings.  Given the large size of her primary tumor and the possibility of siobhan dissection, I did discuss keeping her appointment with Dr. Vale which is scheduled for next Tuesday given the anticipated complexity of operative intervention.  She would likely benefit from postoperative radioactive iodine.  She ready has an appointment with an endocrinologist to discuss this further.  We also discussed ongoing surveillance after surgery with thyroglobulin levels and the need for lifelong thyroid supplementation after total thyroidectomy. Patient will follow-up with me as needed.

## 2022-07-18 ENCOUNTER — TELEPHONE (OUTPATIENT)
Dept: ENDOCRINOLOGY | Facility: CLINIC | Age: 27
End: 2022-07-18
Payer: COMMERCIAL

## 2022-07-18 ENCOUNTER — PATIENT MESSAGE (OUTPATIENT)
Dept: OTOLARYNGOLOGY | Facility: CLINIC | Age: 27
End: 2022-07-18
Payer: COMMERCIAL

## 2022-07-18 DIAGNOSIS — C73 PAPILLARY THYROID CARCINOMA: Primary | ICD-10-CM

## 2022-07-18 RX ORDER — LIDOCAINE HYDROCHLORIDE 10 MG/ML
1 INJECTION, SOLUTION EPIDURAL; INFILTRATION; INTRACAUDAL; PERINEURAL ONCE
Status: CANCELLED | OUTPATIENT
Start: 2022-07-18 | End: 2022-07-18

## 2022-07-18 RX ORDER — SODIUM CHLORIDE 0.9 % (FLUSH) 0.9 %
10 SYRINGE (ML) INJECTION
Status: CANCELLED | OUTPATIENT
Start: 2022-07-18

## 2022-07-18 NOTE — TELEPHONE ENCOUNTER
----- Message from Tanya Ramirez MA sent at 7/15/2022  4:40 PM CDT -----  Contact: pt at [phone]    ----- Message -----  From: Nirmal Moore  Sent: 7/15/2022  10:55 AM CDT  To: Vianney Blake Staff    Type: Needs Medical Advice  Who Called:  pt's mom    Best Call Back Number: [phone]    Additional Information: pt's mom is calling the office returning a call for Kiki. Please call back and advise.

## 2022-07-18 NOTE — TELEPHONE ENCOUNTER
Spoke with Felicitas Christine who was inquiring if we had an earlier appt. with one of our Providers & we do not so she & her daughter will attend the appt on 7/19/22 with ENT surgeon & call us back if an earlier date is recommednded & we will schedule with Dr. Patel at the [facility] location in August.

## 2022-07-18 NOTE — PROGRESS NOTES
Taking advantage of reported OR availability on Thursday to add this on, pending formal eval tomorrow.

## 2022-07-19 ENCOUNTER — HOSPITAL ENCOUNTER (OUTPATIENT)
Dept: RADIOLOGY | Facility: HOSPITAL | Age: 27
Discharge: HOME | End: 2022-07-19
Attending: OTOLARYNGOLOGY
Payer: COMMERCIAL

## 2022-07-19 ENCOUNTER — PATIENT MESSAGE (OUTPATIENT)
Dept: SURGERY | Facility: HOSPITAL | Age: 27
End: 2022-07-19
Payer: COMMERCIAL

## 2022-07-19 ENCOUNTER — OFFICE VISIT (OUTPATIENT)
Dept: OTOLARYNGOLOGY | Facility: CLINIC | Age: 27
End: 2022-07-19
Payer: COMMERCIAL

## 2022-07-19 VITALS
TEMPERATURE: 98 F | BODY MASS INDEX: 31.11 KG/M2 | HEART RATE: 79 BPM | WEIGHT: 198.63 LBS | SYSTOLIC BLOOD PRESSURE: 119 MMHG | DIASTOLIC BLOOD PRESSURE: 80 MMHG

## 2022-07-19 DIAGNOSIS — C73 PAPILLARY THYROID CARCINOMA: ICD-10-CM

## 2022-07-19 PROCEDURE — 70543 MRI ORBT/FAC/NCK W/O &W/DYE: CPT | Mod: TC

## 2022-07-19 PROCEDURE — A9585 GADOBUTROL INJECTION: HCPCS | Performed by: OTOLARYNGOLOGY

## 2022-07-19 PROCEDURE — 3008F BODY MASS INDEX DOCD: CPT | Mod: CPTII,S$GLB,, | Performed by: OTOLARYNGOLOGY

## 2022-07-19 PROCEDURE — 1159F MED LIST DOCD IN RCRD: CPT | Mod: CPTII,S$GLB,, | Performed by: OTOLARYNGOLOGY

## 2022-07-19 PROCEDURE — 99999 PR PBB SHADOW E&M-EST. PATIENT-LVL III: CPT | Mod: PBBFAC,,, | Performed by: OTOLARYNGOLOGY

## 2022-07-19 PROCEDURE — 3079F DIAST BP 80-89 MM HG: CPT | Mod: CPTII,S$GLB,, | Performed by: OTOLARYNGOLOGY

## 2022-07-19 PROCEDURE — 3074F SYST BP LT 130 MM HG: CPT | Mod: CPTII,S$GLB,, | Performed by: OTOLARYNGOLOGY

## 2022-07-19 PROCEDURE — 25500020 PHARM REV CODE 255: Performed by: OTOLARYNGOLOGY

## 2022-07-19 PROCEDURE — 70543 MRI ORBT/FAC/NCK W/O &W/DYE: CPT | Mod: 26,,, | Performed by: RADIOLOGY

## 2022-07-19 PROCEDURE — 1159F PR MEDICATION LIST DOCUMENTED IN MEDICAL RECORD: ICD-10-PCS | Mod: CPTII,S$GLB,, | Performed by: OTOLARYNGOLOGY

## 2022-07-19 PROCEDURE — 70543 MRI SOFT TISSUE NECK W W/O CONTRAST: ICD-10-PCS | Mod: 26,,, | Performed by: RADIOLOGY

## 2022-07-19 PROCEDURE — 99205 PR OFFICE/OUTPT VISIT, NEW, LEVL V, 60-74 MIN: ICD-10-PCS | Mod: S$GLB,,, | Performed by: OTOLARYNGOLOGY

## 2022-07-19 PROCEDURE — 3008F PR BODY MASS INDEX (BMI) DOCUMENTED: ICD-10-PCS | Mod: CPTII,S$GLB,, | Performed by: OTOLARYNGOLOGY

## 2022-07-19 PROCEDURE — 99205 OFFICE O/P NEW HI 60 MIN: CPT | Mod: S$GLB,,, | Performed by: OTOLARYNGOLOGY

## 2022-07-19 PROCEDURE — 99999 PR PBB SHADOW E&M-EST. PATIENT-LVL III: ICD-10-PCS | Mod: PBBFAC,,, | Performed by: OTOLARYNGOLOGY

## 2022-07-19 PROCEDURE — 3079F PR MOST RECENT DIASTOLIC BLOOD PRESSURE 80-89 MM HG: ICD-10-PCS | Mod: CPTII,S$GLB,, | Performed by: OTOLARYNGOLOGY

## 2022-07-19 PROCEDURE — 3074F PR MOST RECENT SYSTOLIC BLOOD PRESSURE < 130 MM HG: ICD-10-PCS | Mod: CPTII,S$GLB,, | Performed by: OTOLARYNGOLOGY

## 2022-07-19 RX ORDER — GADOBUTROL 604.72 MG/ML
10 INJECTION INTRAVENOUS
Status: COMPLETED | OUTPATIENT
Start: 2022-07-19 | End: 2022-07-19

## 2022-07-19 RX ORDER — DIAZEPAM 10 MG/1
10 TABLET ORAL ONCE
Qty: 1 TABLET | Refills: 0 | Status: SHIPPED | OUTPATIENT
Start: 2022-07-19 | End: 2022-08-20

## 2022-07-19 RX ADMIN — GADOBUTROL 10 ML: 604.72 INJECTION INTRAVENOUS at 03:07

## 2022-07-19 NOTE — H&P (VIEW-ONLY)
[facility]    Subjective:       Patient ID: Brennan Christine is a 27 y.o. female.    Chief Complaint: consult/ thyroid ca dx, c/o chest pains    HPI  Oncology History:  Oncology History   Papillary thyroid carcinoma   7/7/2022 Biopsy    FNA = malignant, papillary thyroid carcinoma     7/18/2022 Initial Diagnosis    Papillary thyroid carcinoma     7/19/2022 Cancer Staged    Staging form: Thyroid - Differentiated, AJCC 8th Edition  - Clinical stage from 7/19/2022: Stage I (cT2, cN1a, cM0, Age at diagnosis: < 55 years)       Brennan Christine is a 27 y.o. female who presents for evaluation of a recently diagnosed papillary thyroid carcinoma. She has a several year history of nonspecific symptoms, and she has been evaluated for cardiac, autoimmune, and other issues in this time. She has seen cardiology, rheumatology, and has changed primary care providers, now she is with Dr. Banuelos. She met with Dr. Polanco in rheumatology on June 6, undergoing a comprehensive workup. Dr. Banuelos then examined her neck and felt a thyroid nodule, triggering an ultrasound then US-FNA as above.    Her primary symptoms were fatigue, falling asleep at work, wheezing, shortness of breath (especially while laying down), headaches, weight gain, a facial/neck/leg raised and red rash, palpitations, chest pain, constipation pivoting to diarrhea, body aches, and joint swelling. Additionally, there were hot flashes and chills, plus nausea and a diffuse numbness. She reports that she had a sleep study several years ago.    Past Medical History:   Diagnosis Date    Chronic back pain     Complication of anesthesia Reglan allergy    Papillary thyroid carcinoma 07/18/2022    Venous malformation     Left Arm       Past Surgical History:   Procedure Laterality Date    ADENOIDECTOMY      SCLEROTHERAPY WITH ULTRASOUND GUIDANCE      TONSILLECTOMY         Current Outpatient Medications:     levonorgestrel (MIRENA IU), Mirena  Take No date recorded No form recorded No frequency recorded No route recorded No set duration recorded No set duration amount recorded suspended No dosage strength recorded No dosage strength units of measure recorded, Disp: , Rfl:     diazePAM (VALIUM) 10 MG Tab, Take 1 tablet (10 mg total) by mouth once. for 1 dose, Disp: 1 tablet, Rfl: 0    hydrOXYchloroQUINE (PLAQUENIL) 200 mg tablet, Take 1 tablet (200 mg total) by mouth 2 (two) times daily., Disp: 60 tablet, Rfl: 3    Review of patient's allergies indicates:   Allergen Reactions    Metoclopramide Nausea And Vomiting, Other (See Comments) and Shortness Of Breath    Ceclor [cefaclor]     Niacin Hives    Benzphetamine Nausea And Vomiting and Other (See Comments)    Sulfamethoxazole-trimethoprim Itching, Nausea And Vomiting, Other (See Comments) and Rash       Social History     Socioeconomic History    Marital status: Single   Tobacco Use    Smoking status: Former Smoker     Packs/day: 0.50     Types: Cigarettes     Quit date: 2020     Years since quittin.0    Smokeless tobacco: Never Used   Substance and Sexual Activity    Alcohol use: No    Drug use: No    Sexual activity: Not Currently     Partners: Male     Birth control/protection: I.U.D.     Family History   Problem Relation Age of Onset    Hypertension Mother     Diabetes Mellitus Father     Hypertension Maternal Grandmother     Hypertension Maternal Grandfather     Coronary artery disease Maternal Grandfather     Kidney disease Maternal Grandfather        Review of Systems   Constitutional: Positive for appetite change, chills and unexpected weight change (gaining). Negative for activity change, fatigue and fever.   HENT: Positive for ear discharge, mouth sores, sinus pressure, sore throat, trouble swallowing and voice change. Negative for congestion, dental problem, drooling, ear pain, facial swelling, hearing loss, nosebleeds, postnasal drip, rhinorrhea, sneezing and  tinnitus.    Eyes: Positive for photophobia and itching. Negative for pain and visual disturbance.   Respiratory: Positive for shortness of breath and wheezing. Negative for cough and choking.    Cardiovascular: Positive for chest pain. Negative for palpitations and leg swelling.   Gastrointestinal: Positive for abdominal pain, constipation and diarrhea. Negative for nausea and vomiting.   Endocrine: Positive for cold intolerance and heat intolerance.   Genitourinary: Negative.  Negative for difficulty urinating, dysuria and hematuria.   Musculoskeletal: Positive for back pain and neck pain. Negative for arthralgias, gait problem, myalgias and neck stiffness.   Skin: Positive for rash. Negative for wound.   Allergic/Immunologic: Negative.  Negative for environmental allergies and immunocompromised state.   Neurological: Positive for dizziness, light-headedness and headaches. Negative for facial asymmetry, speech difficulty, weakness and numbness.   Hematological: Negative.  Negative for adenopathy. Does not bruise/bleed easily.   Psychiatric/Behavioral: Negative for dysphoric mood. The patient is nervous/anxious.        Answers for HPI/ROS submitted by the patient on 7/14/2022  Fatigue (Tiredness)?: Yes  Snoring?: Yes  Irregular heartbeat?: Yes  Foot swelling?: Yes  heartburn: Yes  Muscle aches / pain?: Yes    Objective:     Physical Exam  Vitals reviewed.   Constitutional:       General: She is not in acute distress.     Appearance: She is well-developed.   HENT:      Head: Normocephalic and atraumatic.      Jaw: No trismus.      Salivary Glands: Right salivary gland is not diffusely enlarged or tender. Left salivary gland is not diffusely enlarged or tender.      Comments: Salivary glands - there are no lesions or asymmetric findings in the submandibular or parotid glands     Right Ear: Hearing, tympanic membrane, ear canal and external ear normal.      Left Ear: Hearing, tympanic membrane, ear canal and external  ear normal.      Nose: No nasal deformity or rhinorrhea.      Mouth/Throat:      Mouth: No oral lesions.      Tongue: No lesions.      Palate: No mass and lesions.      Pharynx: Uvula midline.      Comments: NP: No lesions of posterior wall  OP: No lesions of posterior wall or BOT. BOT is soft to palpation  Larynx: No lesions of glottic or supraglottic larynx. Normal vocal fold mobility    HP: No lesions of pyriform sinuses or postcricoid region  Mirror examination was performed.    Eyes:      General: Lids are normal.      Extraocular Movements: Extraocular movements intact.      Conjunctiva/sclera:      Right eye: Right conjunctiva is not injected. No chemosis.     Left eye: Left conjunctiva is not injected. No chemosis.  Neck:      Thyroid: Thyroid mass and thyromegaly present.      Vascular: No JVD.      Trachea: Phonation normal. No tracheal deviation.     Pulmonary:      Effort: Pulmonary effort is normal. No accessory muscle usage or respiratory distress.      Breath sounds: No stridor.   Chest:   Breasts:      Right: No supraclavicular adenopathy.      Left: No supraclavicular adenopathy.       Musculoskeletal:         General: No tenderness.      Cervical back: Full passive range of motion without pain.   Lymphadenopathy:      Cervical: No cervical adenopathy.      Right cervical: No deep or posterior cervical adenopathy.     Left cervical: No deep or posterior cervical adenopathy.      Upper Body:      Right upper body: No supraclavicular adenopathy.      Left upper body: No supraclavicular adenopathy.   Skin:     Findings: No erythema, lesion or rash.      Nails: There is no clubbing.   Neurological:      Mental Status: She is alert and oriented to person, place, and time.      Cranial Nerves: No cranial nerve deficit or facial asymmetry.      Motor: No weakness.      Gait: Gait normal.   Psychiatric:         Speech: Speech normal.         Behavior: Behavior is cooperative.          US neck  "6/16/22:  Impression:     There is a 3.7 cm irregular mass of the mid and lower pole of the right thyroid meeting criteria for ultrasound-directed fine needle aspiration.  A 2nd mm nodule is seen in the lower pole of the right thyroid.  Several lymph nodes are noted surrounding the left lobe and thyroid isthmus the largest measuring 1.1 x 0.8 cm.  Overall the thyroid is mildly enlarged    Lab Results   Component Value Date    TSH 1.298 06/08/2022     Assessment & Plan:       Problem List Items Addressed This Visit     Papillary thyroid carcinoma     Brennan Christine is a 27 y.o. female with a recently diagnosed papillary thyroid carcinoma in a large, firm right thyroid nodule. There is paratracheal adenopathy bilaterally, which is uncharacterized but suspicious. Because of the size of the lesion and the radiographically apparent adenopathy, I have recommended a total thyroidectomy. Although a right lobectomy is possible, I do not recommend it because of the apparent adenopathy, which raises the likelihood that she would need CR postoperatively. That would then require a completion thyroidectomy. I have therefore recommended the total thyroidectomy.    Because of the paratracheal adenopathy, I would like an MRI of the neck to check for any lateral neck adenopathy. I will provide some valium for her to tolerate this later today.     We discussed the Venn diagram of symptoms and "things". By drawing a Venn diagram with two circles named as above on the whiteboard, we discussed how things like nodules or a mass or an anatomic variant can be the source of symptoms such as pain or dysphagia - this is true whether we are talking about spinal issues, thyroid nodules, or other conditions. As a cancer surgeon, I typically operate on "things" based on their malignant diagnosis or potential for malignancy, and "things" tend to be more straightforward to address - they are either removed/addressed in total, in part, or not at " "all. Symptoms are more difficult - the thing can be removed perfectly, but the symptoms may persist. This is because symptoms (pain, swallowing issues, etc.) can arise from other causes, such as reflux, infection, and inflammation, among others. I noted that I would not and could not offer a warranty or a guarantee that the symptoms would be improved - they could even worsen - but that the "thing" can be removed or shrunk or otherwise addressed (and this can be confirmed with imaging). This discussion serves mainly to level - set about the intended outcomes.  She expressed understanding.    I explained the risks of thyroidectomy include, but are not limited to, infection, bleeding, scarring, failure to achieve the diagnosis, no evidence of cancer, recurrence, collection of blood or tissue fluid requiring drainage, injury to the recurrent laryngeal nerve with resultant temporary or permanent hoarseness (1% permanent risk with up to 10% temporary risk, greater in revision operations), injury to the superior laryngeal nerve with resultant loss of the upper register for singing or challenges with yelling, temporary or permanent hypocalcemia related to injury or devascularization of the parathyroid glands (less than 5% permanent, up to 30-60% when paratracheal dissection is accomplished, again greater in revision operations), and the need for additional procedures or therapies.  Time was allowed for questions, and all questions were answered to the patient's apparent satisfaction. The risks of paratracheal lymph node dissection are included above. Informed consent was obtained.    Surgery has been scheduled on Thursday, thanks to an alignment of stars.                 "

## 2022-07-19 NOTE — PROGRESS NOTES
[facility]    Subjective:       Patient ID: Brennan Christine is a 27 y.o. female.    Chief Complaint: consult/ thyroid ca dx, c/o chest pains    HPI  Oncology History:  Oncology History   Papillary thyroid carcinoma   7/7/2022 Biopsy    FNA = malignant, papillary thyroid carcinoma     7/18/2022 Initial Diagnosis    Papillary thyroid carcinoma     7/19/2022 Cancer Staged    Staging form: Thyroid - Differentiated, AJCC 8th Edition  - Clinical stage from 7/19/2022: Stage I (cT2, cN1a, cM0, Age at diagnosis: < 55 years)       Brennan Christine is a 27 y.o. female who presents for evaluation of a recently diagnosed papillary thyroid carcinoma. She has a several year history of nonspecific symptoms, and she has been evaluated for cardiac, autoimmune, and other issues in this time. She has seen cardiology, rheumatology, and has changed primary care providers, now she is with Dr. Banuelos. She met with Dr. Polanco in rheumatology on June 6, undergoing a comprehensive workup. Dr. Banuelos then examined her neck and felt a thyroid nodule, triggering an ultrasound then US-FNA as above.    Her primary symptoms were fatigue, falling asleep at work, wheezing, shortness of breath (especially while laying down), headaches, weight gain, a facial/neck/leg raised and red rash, palpitations, chest pain, constipation pivoting to diarrhea, body aches, and joint swelling. Additionally, there were hot flashes and chills, plus nausea and a diffuse numbness. She reports that she had a sleep study several years ago.    Past Medical History:   Diagnosis Date    Chronic back pain     Complication of anesthesia Reglan allergy    Papillary thyroid carcinoma 07/18/2022    Venous malformation     Left Arm       Past Surgical History:   Procedure Laterality Date    ADENOIDECTOMY      SCLEROTHERAPY WITH ULTRASOUND GUIDANCE      TONSILLECTOMY         Current Outpatient Medications:     levonorgestrel (MIRENA IU), Mirena  Take No date recorded No form recorded No frequency recorded No route recorded No set duration recorded No set duration amount recorded suspended No dosage strength recorded No dosage strength units of measure recorded, Disp: , Rfl:     diazePAM (VALIUM) 10 MG Tab, Take 1 tablet (10 mg total) by mouth once. for 1 dose, Disp: 1 tablet, Rfl: 0    hydrOXYchloroQUINE (PLAQUENIL) 200 mg tablet, Take 1 tablet (200 mg total) by mouth 2 (two) times daily., Disp: 60 tablet, Rfl: 3    Review of patient's allergies indicates:   Allergen Reactions    Metoclopramide Nausea And Vomiting, Other (See Comments) and Shortness Of Breath    Ceclor [cefaclor]     Niacin Hives    Benzphetamine Nausea And Vomiting and Other (See Comments)    Sulfamethoxazole-trimethoprim Itching, Nausea And Vomiting, Other (See Comments) and Rash       Social History     Socioeconomic History    Marital status: Single   Tobacco Use    Smoking status: Former Smoker     Packs/day: 0.50     Types: Cigarettes     Quit date: 2020     Years since quittin.0    Smokeless tobacco: Never Used   Substance and Sexual Activity    Alcohol use: No    Drug use: No    Sexual activity: Not Currently     Partners: Male     Birth control/protection: I.U.D.     Family History   Problem Relation Age of Onset    Hypertension Mother     Diabetes Mellitus Father     Hypertension Maternal Grandmother     Hypertension Maternal Grandfather     Coronary artery disease Maternal Grandfather     Kidney disease Maternal Grandfather        Review of Systems   Constitutional: Positive for appetite change, chills and unexpected weight change (gaining). Negative for activity change, fatigue and fever.   HENT: Positive for ear discharge, mouth sores, sinus pressure, sore throat, trouble swallowing and voice change. Negative for congestion, dental problem, drooling, ear pain, facial swelling, hearing loss, nosebleeds, postnasal drip, rhinorrhea, sneezing and  tinnitus.    Eyes: Positive for photophobia and itching. Negative for pain and visual disturbance.   Respiratory: Positive for shortness of breath and wheezing. Negative for cough and choking.    Cardiovascular: Positive for chest pain. Negative for palpitations and leg swelling.   Gastrointestinal: Positive for abdominal pain, constipation and diarrhea. Negative for nausea and vomiting.   Endocrine: Positive for cold intolerance and heat intolerance.   Genitourinary: Negative.  Negative for difficulty urinating, dysuria and hematuria.   Musculoskeletal: Positive for back pain and neck pain. Negative for arthralgias, gait problem, myalgias and neck stiffness.   Skin: Positive for rash. Negative for wound.   Allergic/Immunologic: Negative.  Negative for environmental allergies and immunocompromised state.   Neurological: Positive for dizziness, light-headedness and headaches. Negative for facial asymmetry, speech difficulty, weakness and numbness.   Hematological: Negative.  Negative for adenopathy. Does not bruise/bleed easily.   Psychiatric/Behavioral: Negative for dysphoric mood. The patient is nervous/anxious.        Answers for HPI/ROS submitted by the patient on 7/14/2022  Fatigue (Tiredness)?: Yes  Snoring?: Yes  Irregular heartbeat?: Yes  Foot swelling?: Yes  heartburn: Yes  Muscle aches / pain?: Yes    Objective:     Physical Exam  Vitals reviewed.   Constitutional:       General: She is not in acute distress.     Appearance: She is well-developed.   HENT:      Head: Normocephalic and atraumatic.      Jaw: No trismus.      Salivary Glands: Right salivary gland is not diffusely enlarged or tender. Left salivary gland is not diffusely enlarged or tender.      Comments: Salivary glands - there are no lesions or asymmetric findings in the submandibular or parotid glands     Right Ear: Hearing, tympanic membrane, ear canal and external ear normal.      Left Ear: Hearing, tympanic membrane, ear canal and external  ear normal.      Nose: No nasal deformity or rhinorrhea.      Mouth/Throat:      Mouth: No oral lesions.      Tongue: No lesions.      Palate: No mass and lesions.      Pharynx: Uvula midline.      Comments: NP: No lesions of posterior wall  OP: No lesions of posterior wall or BOT. BOT is soft to palpation  Larynx: No lesions of glottic or supraglottic larynx. Normal vocal fold mobility    HP: No lesions of pyriform sinuses or postcricoid region  Mirror examination was performed.    Eyes:      General: Lids are normal.      Extraocular Movements: Extraocular movements intact.      Conjunctiva/sclera:      Right eye: Right conjunctiva is not injected. No chemosis.     Left eye: Left conjunctiva is not injected. No chemosis.  Neck:      Thyroid: Thyroid mass and thyromegaly present.      Vascular: No JVD.      Trachea: Phonation normal. No tracheal deviation.     Pulmonary:      Effort: Pulmonary effort is normal. No accessory muscle usage or respiratory distress.      Breath sounds: No stridor.   Chest:   Breasts:      Right: No supraclavicular adenopathy.      Left: No supraclavicular adenopathy.       Musculoskeletal:         General: No tenderness.      Cervical back: Full passive range of motion without pain.   Lymphadenopathy:      Cervical: No cervical adenopathy.      Right cervical: No deep or posterior cervical adenopathy.     Left cervical: No deep or posterior cervical adenopathy.      Upper Body:      Right upper body: No supraclavicular adenopathy.      Left upper body: No supraclavicular adenopathy.   Skin:     Findings: No erythema, lesion or rash.      Nails: There is no clubbing.   Neurological:      Mental Status: She is alert and oriented to person, place, and time.      Cranial Nerves: No cranial nerve deficit or facial asymmetry.      Motor: No weakness.      Gait: Gait normal.   Psychiatric:         Speech: Speech normal.         Behavior: Behavior is cooperative.          US neck  "6/16/22:  Impression:     There is a 3.7 cm irregular mass of the mid and lower pole of the right thyroid meeting criteria for ultrasound-directed fine needle aspiration.  A 2nd mm nodule is seen in the lower pole of the right thyroid.  Several lymph nodes are noted surrounding the left lobe and thyroid isthmus the largest measuring 1.1 x 0.8 cm.  Overall the thyroid is mildly enlarged    Lab Results   Component Value Date    TSH 1.298 06/08/2022     Assessment & Plan:       Problem List Items Addressed This Visit     Papillary thyroid carcinoma     Bernnan Christine is a 27 y.o. female with a recently diagnosed papillary thyroid carcinoma in a large, firm right thyroid nodule. There is paratracheal adenopathy bilaterally, which is uncharacterized but suspicious. Because of the size of the lesion and the radiographically apparent adenopathy, I have recommended a total thyroidectomy. Although a right lobectomy is possible, I do not recommend it because of the apparent adenopathy, which raises the likelihood that she would need CR postoperatively. That would then require a completion thyroidectomy. I have therefore recommended the total thyroidectomy.    Because of the paratracheal adenopathy, I would like an MRI of the neck to check for any lateral neck adenopathy. I will provide some valium for her to tolerate this later today.     We discussed the Venn diagram of symptoms and "things". By drawing a Venn diagram with two circles named as above on the whiteboard, we discussed how things like nodules or a mass or an anatomic variant can be the source of symptoms such as pain or dysphagia - this is true whether we are talking about spinal issues, thyroid nodules, or other conditions. As a cancer surgeon, I typically operate on "things" based on their malignant diagnosis or potential for malignancy, and "things" tend to be more straightforward to address - they are either removed/addressed in total, in part, or not at " "all. Symptoms are more difficult - the thing can be removed perfectly, but the symptoms may persist. This is because symptoms (pain, swallowing issues, etc.) can arise from other causes, such as reflux, infection, and inflammation, among others. I noted that I would not and could not offer a warranty or a guarantee that the symptoms would be improved - they could even worsen - but that the "thing" can be removed or shrunk or otherwise addressed (and this can be confirmed with imaging). This discussion serves mainly to level - set about the intended outcomes.  She expressed understanding.    I explained the risks of thyroidectomy include, but are not limited to, infection, bleeding, scarring, failure to achieve the diagnosis, no evidence of cancer, recurrence, collection of blood or tissue fluid requiring drainage, injury to the recurrent laryngeal nerve with resultant temporary or permanent hoarseness (1% permanent risk with up to 10% temporary risk, greater in revision operations), injury to the superior laryngeal nerve with resultant loss of the upper register for singing or challenges with yelling, temporary or permanent hypocalcemia related to injury or devascularization of the parathyroid glands (less than 5% permanent, up to 30-60% when paratracheal dissection is accomplished, again greater in revision operations), and the need for additional procedures or therapies.  Time was allowed for questions, and all questions were answered to the patient's apparent satisfaction. The risks of paratracheal lymph node dissection are included above. Informed consent was obtained.    Surgery has been scheduled on Thursday, thanks to an alignment of stars.                 "

## 2022-07-19 NOTE — PATIENT INSTRUCTIONS
Surgery/procedure time and date: 7/21/22 @ 0900                             Arrival time: 0700  (usually at least 1 hour prior to surgery/procedure)    Stop ALL solid food, gum, candy (including vitamins) 8 hours before surgery/procedure time.  Stop all CLOUDY liquids: coffee with creamer, formula, tube feeds, cloudy juices, non-human milk and breast milk with additives, 6 hours prior to surgery/procedure time.  Stop plain breast milk 4 hours prior to surgery/procedure time.  The patient should be ENCOURAGED to drink carbohydrate-rich clear liquids (sports drinks, clear juices) until 2 hours prior to surgery/procedure time.  CLEAR liquids include only water, black coffee NO creamer, clear oral rehydration drinks, clear sports drinks or clear fruit juices (no orange juice, no pulpy juices, no apple cider). Advise patients if they can read newsprint through the liquid, it qualifies as clear liquid.   IF IN DOUBT, drink water instead.   NOTHING TO EAT OR DRINK 2 hours before to surgery/procedure time. If you are told to take medication on the morning of surgery, it may be taken with a sip of water.     The team will call the week before (or the day before) surgery to tell you the arrival time.    The anesthesia preop center will call to ask you some questions before surgery.     Please stop aspirin 2 weeks before surgery, plavix 1 week before surgery, and other blood thinners 5 days before surgery, if indicated. Sometimes the anesthesia team or your doctors will want you stay on at least one blood thinner - they will let you know.     For your day of surgery, please come to [facility] on the 2nd floor of the main hospital - follow the signs.

## 2022-07-19 NOTE — ASSESSMENT & PLAN NOTE
"Brennan Christine is a 27 y.o. female with a recently diagnosed papillary thyroid carcinoma in a large, firm right thyroid nodule. There is paratracheal adenopathy bilaterally, which is uncharacterized but suspicious. Because of the size of the lesion and the radiographically apparent adenopathy, I have recommended a total thyroidectomy. Although a right lobectomy is possible, I do not recommend it because of the apparent adenopathy, which raises the likelihood that she would need CR postoperatively. That would then require a completion thyroidectomy. I have therefore recommended the total thyroidectomy.    Because of the paratracheal adenopathy, I would like an MRI of the neck to check for any lateral neck adenopathy. I will provide some valium for her to tolerate this later today.     We discussed the Venn diagram of symptoms and "things". By drawing a Venn diagram with two circles named as above on the whiteboard, we discussed how things like nodules or a mass or an anatomic variant can be the source of symptoms such as pain or dysphagia - this is true whether we are talking about spinal issues, thyroid nodules, or other conditions. As a cancer surgeon, I typically operate on "things" based on their malignant diagnosis or potential for malignancy, and "things" tend to be more straightforward to address - they are either removed/addressed in total, in part, or not at all. Symptoms are more difficult - the thing can be removed perfectly, but the symptoms may persist. This is because symptoms (pain, swallowing issues, etc.) can arise from other causes, such as reflux, infection, and inflammation, among others. I noted that I would not and could not offer a warranty or a guarantee that the symptoms would be improved - they could even worsen - but that the "thing" can be removed or shrunk or otherwise addressed (and this can be confirmed with imaging). This discussion serves mainly to level - set about the intended " outcomes.  She expressed understanding.    I explained the risks of thyroidectomy include, but are not limited to, infection, bleeding, scarring, failure to achieve the diagnosis, no evidence of cancer, recurrence, collection of blood or tissue fluid requiring drainage, injury to the recurrent laryngeal nerve with resultant temporary or permanent hoarseness (1% permanent risk with up to 10% temporary risk, greater in revision operations), injury to the superior laryngeal nerve with resultant loss of the upper register for singing or challenges with yelling, temporary or permanent hypocalcemia related to injury or devascularization of the parathyroid glands (less than 5% permanent, up to 30-60% when paratracheal dissection is accomplished, again greater in revision operations), and the need for additional procedures or therapies.  Time was allowed for questions, and all questions were answered to the patient's apparent satisfaction. The risks of paratracheal lymph node dissection are included above. Informed consent was obtained.    Surgery has been scheduled on Thursday, thanks to an alignment of stars.

## 2022-07-20 ENCOUNTER — TELEPHONE (OUTPATIENT)
Dept: OTOLARYNGOLOGY | Facility: CLINIC | Age: 27
End: 2022-07-20
Payer: COMMERCIAL

## 2022-07-20 ENCOUNTER — PATIENT MESSAGE (OUTPATIENT)
Dept: OTOLARYNGOLOGY | Facility: CLINIC | Age: 27
End: 2022-07-20
Payer: COMMERCIAL

## 2022-07-20 ENCOUNTER — ANESTHESIA EVENT (OUTPATIENT)
Dept: SURGERY | Facility: HOSPITAL | Age: 27
End: 2022-07-20
Payer: COMMERCIAL

## 2022-07-20 NOTE — ANESTHESIA PREPROCEDURE EVALUATION
[facility]  Anesthesia Pre-Operative Evaluation        Patient Name: Brennan Christine  YOB: 1995  MRN: [id number]    SUBJECTIVE:     Pre-operative Evaluation for Procedure(s) (LRB):  THYROIDECTOMY (Bilateral)     07/20/2022    Brennan Christine is a 27 y.o. female with a PMHx significant for PONV, Obesity (31.11), Chronic back pain, papillary thyroid carcinoma, and venous malformation.    She now presents for the above procedure(s).    Previous Airway: None documented.    Patient Active Problem List   Diagnosis    Papillary thyroid carcinoma       Review of patient's allergies indicates:   Allergen Reactions    Metoclopramide Nausea And Vomiting, Other (See Comments) and Shortness Of Breath    Niacin Hives    Ceclor [cefaclor]     Benzphetamine Nausea And Vomiting and Other (See Comments)    Sulfamethoxazole-trimethoprim Itching, Nausea And Vomiting, Rash and Other (See Comments)     BACTRIM       Current Outpatient Medications   Medication Instructions    diazePAM (VALIUM) 10 mg, Oral, Once    levonorgestrel (MIRENA IU) Mirena Take No date recorded No form recorded No frequency recorded No route recorded No set duration recorded No set duration amount recorded suspended No dosage strength recorded No dosage strength units of measure recorded       Past Surgical History:   Procedure Laterality Date    ADENOIDECTOMY      SCLEROTHERAPY WITH ULTRASOUND GUIDANCE      TONSILLECTOMY         Social History     Substance and Sexual Activity   Drug Use No     Alcohol Use: Not on file     Tobacco Use: Medium Risk    Smoking Tobacco Use: Former Smoker    Smokeless Tobacco Use: Never Used       OBJECTIVE:     Vital Signs Range (Last 24H):         Significant Labs    Heme Profile  Lab Results   Component Value Date    WBC 5.14 06/07/2022    HGB 13.8 06/07/2022    HCT 40.9 06/07/2022     06/07/2022       Coagulation Studies  No results found for: LABPROT, INR, APTT    BMP  Lab  Results   Component Value Date     2022    K 4.0 2022     2022    CO2 22 (L) 2022    BUN 14 2022    CREATININE 0.8 2022       Liver Function Tests  Lab Results   Component Value Date    AST 18 2022    ALT 22 2022    ALKPHOS 63 2022    BILITOT 0.7 2022    PROT 7.8 2022    ALBUMIN 4.5 2022       Lipid Profile  Lab Results   Component Value Date    CHOL 180 2022    HDL 37 (L) 2022    TRIG 111 2022       Endocrine Profile  Lab Results   Component Value Date    TSH 1.298 2022       Diagnostic Studies      Cardiac Studies    EKG:   Results for orders placed or performed during the hospital encounter of 16   EKG 12-lead    Collection Time: 16  9:26 PM    Narrative                                         [facility]                                                                                  Test Date:    2016  Pat Name:     CLEMENTINE HOGUE          Department:     Patient ID:   [id number]                  Room:           Gender:       Female                   Technician:   [facility]  :          1995               Requested By:   Order Number:                          Reading MD:   Baljit Miramontes MD                                   Measurements  Intervals                              Axis            Rate:         80                       P:            47  WI:           118                      QRS:          83  QRSD:         88                       T:            53  QT:           370                                      QTc:          426                                                                 Interpretive Statements  Sinus rhythm with occasional premature ventricular complexes  Otherwise normal ECG  No previous ECG available for comparison    Electronically Signed On 2016 14:28:30 CDT by Baljit Miramontes MD         TTE:  No results found for this or any previous visit.      JESSE:  No  "results found for this or any previous visit.      ASSESSMENT/PLAN:         Pre-op Assessment    I have reviewed the Patient Summary Reports.     I have reviewed the Nursing Notes. I have reviewed the NPO Status.   I have reviewed the Medications.     Review of Systems  Anesthesia Hx:  Hx of Anesthetic complications PONV. Patient states it requires more medication for her to "fall asleep" Personal Hx of Anesthesia complications, Post-Operative Nausea/Vomiting   Social:  Former Smoker    Hematology/Oncology:        Current/Recent Cancer.   Endocrine:  Obesity / BMI > 30      Physical Exam  General: Well nourished, Cooperative, Alert and Oriented    Airway:  Mallampati: III / II  Mouth Opening: Normal  TM Distance: Normal  Tongue: Normal  Neck ROM: Normal ROM    Dental:  Intact    Patient reported that it takes more medication than the normal to keep her "asleep"    Anesthesia Plan  Type of Anesthesia, risks & benefits discussed:    Anesthesia Type: Gen ETT  Intra-op Monitoring Plan: Standard ASA Monitors  Post Op Pain Control Plan: multimodal analgesia and IV/PO Opioids PRN  Induction:  IV  Airway Plan: Direct, Post-Induction  Informed Consent: Informed consent signed with the Patient and all parties understand the risks and agree with anesthesia plan.  All questions answered. Patient consented to blood products? Yes  ASA Score: 2  Day of Surgery Review of History & Physical: H&P Update referred to the surgeon/provider.    Ready For Surgery From Anesthesia Perspective.     .      "

## 2022-07-20 NOTE — PRE-PROCEDURE INSTRUCTIONS
PREOP INSTRUCTIONS:  No food,milk or milk products after midnight  Morning medications may be taken with a few sips of water.  Instructed to follow the surgeon's instructions if they differ from these.  Shower instructions as well as directions to the Surgery Center were given.  Encouraged to wear loose fitting,comfortable clothing.  Medication instructions for pm prior to and am of procedure reviewed.  Instructed to avoid taking vitamins,supplements,aspirin and ibuprofen the morning of surgery.

## 2022-07-20 NOTE — TELEPHONE ENCOUNTER
I called the patient and her mother and reviewed the MRI. No changes from what we had known, specifically no evidence of adenopathy in the lateral neck(s). The plan remains total thyroidectomy with bilateral paratracheal and superior mediastinal dissection.   They expressed understanding.       ----- Message from Sasha Ruggiero RN sent at 7/20/2022 11:00 AM CDT -----  Regarding: FW: mri results  Requesting results.   Josh Brown    ----- Message -----  From: Malka Berg  Sent: 7/20/2022  10:46 AM CDT  To: Kavin TAVERA Staff  Subject: mri results                                      Name of Who is Calling: Felicitas gordillo       What is the request in detail:Patient Mother is requesting a call back concerning her daughter MRI results    Can the clinic reply by [facility]: no      What Number to Call Back if not in [facility]: [phone]

## 2022-07-21 ENCOUNTER — HOSPITAL ENCOUNTER (OUTPATIENT)
Facility: HOSPITAL | Age: 27
Discharge: HOME | End: 2022-07-22
Attending: OTOLARYNGOLOGY | Admitting: OTOLARYNGOLOGY
Payer: COMMERCIAL

## 2022-07-21 ENCOUNTER — ANESTHESIA (OUTPATIENT)
Dept: SURGERY | Facility: HOSPITAL | Age: 27
End: 2022-07-21
Payer: COMMERCIAL

## 2022-07-21 DIAGNOSIS — C73 PAPILLARY THYROID CARCINOMA: Primary | ICD-10-CM

## 2022-07-21 LAB
B-HCG UR QL: NEGATIVE
CTP QC/QA: YES
CTP QC/QA: YES
PTH-INTACT SERPL-MCNC: <5 PG/ML (ref 9–77)
SARS-COV-2 AG RESP QL IA.RAPID: NEGATIVE

## 2022-07-21 PROCEDURE — 25000003 PHARM REV CODE 250: Performed by: STUDENT IN AN ORGANIZED HEALTH CARE EDUCATION/TRAINING PROGRAM

## 2022-07-21 PROCEDURE — D9220A PRA ANESTHESIA: Mod: ,,, | Performed by: STUDENT IN AN ORGANIZED HEALTH CARE EDUCATION/TRAINING PROGRAM

## 2022-07-21 PROCEDURE — 60252 PR THYROIDECTOMY,MALIG,LTD NECK SURG: ICD-10-PCS | Mod: 51,,, | Performed by: OTOLARYNGOLOGY

## 2022-07-21 PROCEDURE — 88307 TISSUE EXAM BY PATHOLOGIST: CPT | Mod: 26,,, | Performed by: STUDENT IN AN ORGANIZED HEALTH CARE EDUCATION/TRAINING PROGRAM

## 2022-07-21 PROCEDURE — 71000033 HC RECOVERY, INTIAL HOUR: Performed by: OTOLARYNGOLOGY

## 2022-07-21 PROCEDURE — 36000706: Performed by: OTOLARYNGOLOGY

## 2022-07-21 PROCEDURE — 37000009 HC ANESTHESIA EA ADD 15 MINS: Performed by: OTOLARYNGOLOGY

## 2022-07-21 PROCEDURE — C1729 CATH, DRAINAGE: HCPCS | Performed by: OTOLARYNGOLOGY

## 2022-07-21 PROCEDURE — 63600175 PHARM REV CODE 636 W HCPCS: Performed by: STUDENT IN AN ORGANIZED HEALTH CARE EDUCATION/TRAINING PROGRAM

## 2022-07-21 PROCEDURE — 88331 PATH CONSLTJ SURG 1 BLK 1SPC: CPT | Mod: 59 | Performed by: STUDENT IN AN ORGANIZED HEALTH CARE EDUCATION/TRAINING PROGRAM

## 2022-07-21 PROCEDURE — 83970 ASSAY OF PARATHORMONE: CPT | Performed by: STUDENT IN AN ORGANIZED HEALTH CARE EDUCATION/TRAINING PROGRAM

## 2022-07-21 PROCEDURE — 25000003 PHARM REV CODE 250

## 2022-07-21 PROCEDURE — 60512 PR AUTOTRANSPLANT, PARATHYROID: ICD-10-PCS | Mod: ,,, | Performed by: OTOLARYNGOLOGY

## 2022-07-21 PROCEDURE — 88307 PR  SURG PATH,LEVEL V: ICD-10-PCS | Mod: 26,,, | Performed by: STUDENT IN AN ORGANIZED HEALTH CARE EDUCATION/TRAINING PROGRAM

## 2022-07-21 PROCEDURE — 27201423 OPTIME MED/SURG SUP & DEVICES STERILE SUPPLY: Performed by: OTOLARYNGOLOGY

## 2022-07-21 PROCEDURE — 60512 AUTOTRANSPLANT PARATHYROID: CPT | Mod: ,,, | Performed by: OTOLARYNGOLOGY

## 2022-07-21 PROCEDURE — 63600175 PHARM REV CODE 636 W HCPCS

## 2022-07-21 PROCEDURE — 88305 TISSUE EXAM BY PATHOLOGIST: CPT | Performed by: STUDENT IN AN ORGANIZED HEALTH CARE EDUCATION/TRAINING PROGRAM

## 2022-07-21 PROCEDURE — 88305 TISSUE EXAM BY PATHOLOGIST: ICD-10-PCS | Mod: 26,,, | Performed by: STUDENT IN AN ORGANIZED HEALTH CARE EDUCATION/TRAINING PROGRAM

## 2022-07-21 PROCEDURE — 88307 TISSUE EXAM BY PATHOLOGIST: CPT | Performed by: STUDENT IN AN ORGANIZED HEALTH CARE EDUCATION/TRAINING PROGRAM

## 2022-07-21 PROCEDURE — 88305 TISSUE EXAM BY PATHOLOGIST: CPT | Mod: 26,,, | Performed by: STUDENT IN AN ORGANIZED HEALTH CARE EDUCATION/TRAINING PROGRAM

## 2022-07-21 PROCEDURE — D9220A PRA ANESTHESIA: ICD-10-PCS | Mod: ,,, | Performed by: STUDENT IN AN ORGANIZED HEALTH CARE EDUCATION/TRAINING PROGRAM

## 2022-07-21 PROCEDURE — 25000003 PHARM REV CODE 250: Performed by: OTOLARYNGOLOGY

## 2022-07-21 PROCEDURE — 81025 URINE PREGNANCY TEST: CPT | Performed by: OTOLARYNGOLOGY

## 2022-07-21 PROCEDURE — 36000707: Performed by: OTOLARYNGOLOGY

## 2022-07-21 PROCEDURE — 60252 REMOVAL OF THYROID: CPT | Mod: 51,,, | Performed by: OTOLARYNGOLOGY

## 2022-07-21 PROCEDURE — 88331 PR  PATH CONSULT IN SURG,W FRZ SEC: ICD-10-PCS | Mod: 26,,, | Performed by: STUDENT IN AN ORGANIZED HEALTH CARE EDUCATION/TRAINING PROGRAM

## 2022-07-21 PROCEDURE — 94761 N-INVAS EAR/PLS OXIMETRY MLT: CPT

## 2022-07-21 PROCEDURE — 71000015 HC POSTOP RECOV 1ST HR: Performed by: OTOLARYNGOLOGY

## 2022-07-21 PROCEDURE — 37000008 HC ANESTHESIA 1ST 15 MINUTES: Performed by: OTOLARYNGOLOGY

## 2022-07-21 PROCEDURE — 88331 PATH CONSLTJ SURG 1 BLK 1SPC: CPT | Mod: 26,,, | Performed by: STUDENT IN AN ORGANIZED HEALTH CARE EDUCATION/TRAINING PROGRAM

## 2022-07-21 RX ORDER — PROCHLORPERAZINE EDISYLATE 5 MG/ML
5 INJECTION INTRAMUSCULAR; INTRAVENOUS EVERY 6 HOURS PRN
Status: DISCONTINUED | OUTPATIENT
Start: 2022-07-21 | End: 2022-07-22 | Stop reason: HOSPADM

## 2022-07-21 RX ORDER — REMIFENTANIL HYDROCHLORIDE 1 MG/ML
INJECTION, POWDER, LYOPHILIZED, FOR SOLUTION INTRAVENOUS
Status: DISCONTINUED | OUTPATIENT
Start: 2022-07-21 | End: 2022-07-21

## 2022-07-21 RX ORDER — LIDOCAINE HYDROCHLORIDE 20 MG/ML
INJECTION, SOLUTION EPIDURAL; INFILTRATION; INTRACAUDAL; PERINEURAL
Status: DISCONTINUED | OUTPATIENT
Start: 2022-07-21 | End: 2022-07-21

## 2022-07-21 RX ORDER — LEVOTHYROXINE SODIUM 112 UG/1
112 TABLET ORAL
Status: DISCONTINUED | OUTPATIENT
Start: 2022-07-22 | End: 2022-07-22 | Stop reason: HOSPADM

## 2022-07-21 RX ORDER — LIDOCAINE HYDROCHLORIDE 10 MG/ML
1 INJECTION, SOLUTION EPIDURAL; INFILTRATION; INTRACAUDAL; PERINEURAL ONCE
Status: DISCONTINUED | OUTPATIENT
Start: 2022-07-21 | End: 2022-07-21 | Stop reason: HOSPADM

## 2022-07-21 RX ORDER — LIDOCAINE HYDROCHLORIDE AND EPINEPHRINE 10; 10 MG/ML; UG/ML
INJECTION, SOLUTION INFILTRATION; PERINEURAL
Status: DISCONTINUED | OUTPATIENT
Start: 2022-07-21 | End: 2022-07-21 | Stop reason: HOSPADM

## 2022-07-21 RX ORDER — PROPOFOL 10 MG/ML
VIAL (ML) INTRAVENOUS CONTINUOUS PRN
Status: DISCONTINUED | OUTPATIENT
Start: 2022-07-21 | End: 2022-07-21

## 2022-07-21 RX ORDER — ONDANSETRON 2 MG/ML
INJECTION INTRAMUSCULAR; INTRAVENOUS
Status: DISCONTINUED | OUTPATIENT
Start: 2022-07-21 | End: 2022-07-21

## 2022-07-21 RX ORDER — DEXTROSE MONOHYDRATE, SODIUM CHLORIDE, AND POTASSIUM CHLORIDE 50; 1.49; 9 G/1000ML; G/1000ML; G/1000ML
INJECTION, SOLUTION INTRAVENOUS CONTINUOUS
Status: DISCONTINUED | OUTPATIENT
Start: 2022-07-21 | End: 2022-07-22 | Stop reason: HOSPADM

## 2022-07-21 RX ORDER — SCOLOPAMINE TRANSDERMAL SYSTEM 1 MG/1
1 PATCH, EXTENDED RELEASE TRANSDERMAL ONCE
Status: DISCONTINUED | OUTPATIENT
Start: 2022-07-21 | End: 2022-07-22 | Stop reason: HOSPADM

## 2022-07-21 RX ORDER — ACETAMINOPHEN 500 MG
1000 TABLET ORAL ONCE
Status: COMPLETED | OUTPATIENT
Start: 2022-07-21 | End: 2022-07-21

## 2022-07-21 RX ORDER — SODIUM CHLORIDE 0.9 % (FLUSH) 0.9 %
10 SYRINGE (ML) INJECTION
Status: DISCONTINUED | OUTPATIENT
Start: 2022-07-21 | End: 2022-07-22 | Stop reason: HOSPADM

## 2022-07-21 RX ORDER — SODIUM CHLORIDE 0.9 % (FLUSH) 0.9 %
10 SYRINGE (ML) INJECTION
Status: DISCONTINUED | OUTPATIENT
Start: 2022-07-21 | End: 2022-07-21 | Stop reason: HOSPADM

## 2022-07-21 RX ORDER — CALCITRIOL 0.25 UG/1
0.25 CAPSULE ORAL DAILY
Status: DISCONTINUED | OUTPATIENT
Start: 2022-07-22 | End: 2022-07-22 | Stop reason: HOSPADM

## 2022-07-21 RX ORDER — CLINDAMYCIN PHOSPHATE 900 MG/50ML
900 INJECTION, SOLUTION INTRAVENOUS
Status: COMPLETED | OUTPATIENT
Start: 2022-07-21 | End: 2022-07-21

## 2022-07-21 RX ORDER — HALOPERIDOL 5 MG/ML
0.5 INJECTION INTRAMUSCULAR EVERY 10 MIN PRN
Status: DISCONTINUED | OUTPATIENT
Start: 2022-07-21 | End: 2022-07-21 | Stop reason: HOSPADM

## 2022-07-21 RX ORDER — PROPOFOL 10 MG/ML
VIAL (ML) INTRAVENOUS
Status: DISCONTINUED | OUTPATIENT
Start: 2022-07-21 | End: 2022-07-21

## 2022-07-21 RX ORDER — DEXAMETHASONE SODIUM PHOSPHATE 4 MG/ML
INJECTION, SOLUTION INTRA-ARTICULAR; INTRALESIONAL; INTRAMUSCULAR; INTRAVENOUS; SOFT TISSUE
Status: DISCONTINUED | OUTPATIENT
Start: 2022-07-21 | End: 2022-07-21

## 2022-07-21 RX ORDER — FENTANYL CITRATE 50 UG/ML
25 INJECTION, SOLUTION INTRAMUSCULAR; INTRAVENOUS EVERY 5 MIN PRN
Status: DISCONTINUED | OUTPATIENT
Start: 2022-07-21 | End: 2022-07-21 | Stop reason: HOSPADM

## 2022-07-21 RX ORDER — CALCIUM CARBONATE 200(500)MG
2000 TABLET,CHEWABLE ORAL 2 TIMES DAILY
Status: DISCONTINUED | OUTPATIENT
Start: 2022-07-21 | End: 2022-07-21

## 2022-07-21 RX ORDER — CALCIUM CARBONATE 200(500)MG
2000 TABLET,CHEWABLE ORAL 3 TIMES DAILY
Status: DISCONTINUED | OUTPATIENT
Start: 2022-07-21 | End: 2022-07-22 | Stop reason: HOSPADM

## 2022-07-21 RX ORDER — EPHEDRINE SULFATE 50 MG/ML
INJECTION, SOLUTION INTRAVENOUS
Status: DISCONTINUED | OUTPATIENT
Start: 2022-07-21 | End: 2022-07-21

## 2022-07-21 RX ORDER — MIDAZOLAM HYDROCHLORIDE 1 MG/ML
INJECTION, SOLUTION INTRAMUSCULAR; INTRAVENOUS
Status: DISCONTINUED | OUTPATIENT
Start: 2022-07-21 | End: 2022-07-21

## 2022-07-21 RX ORDER — HYDROMORPHONE HYDROCHLORIDE 1 MG/ML
0.2 INJECTION, SOLUTION INTRAMUSCULAR; INTRAVENOUS; SUBCUTANEOUS EVERY 5 MIN PRN
Status: DISCONTINUED | OUTPATIENT
Start: 2022-07-21 | End: 2022-07-21 | Stop reason: HOSPADM

## 2022-07-21 RX ORDER — ONDANSETRON 2 MG/ML
4 INJECTION INTRAMUSCULAR; INTRAVENOUS EVERY 12 HOURS PRN
Status: DISCONTINUED | OUTPATIENT
Start: 2022-07-21 | End: 2022-07-22 | Stop reason: HOSPADM

## 2022-07-21 RX ORDER — SUCCINYLCHOLINE CHLORIDE 20 MG/ML
INJECTION INTRAMUSCULAR; INTRAVENOUS
Status: DISCONTINUED | OUTPATIENT
Start: 2022-07-21 | End: 2022-07-21

## 2022-07-21 RX ORDER — HYDROCODONE BITARTRATE AND ACETAMINOPHEN 7.5; 325 MG/15ML; MG/15ML
15 SOLUTION ORAL EVERY 4 HOURS PRN
Status: DISCONTINUED | OUTPATIENT
Start: 2022-07-21 | End: 2022-07-22 | Stop reason: HOSPADM

## 2022-07-21 RX ORDER — MORPHINE SULFATE 2 MG/ML
1 INJECTION, SOLUTION INTRAMUSCULAR; INTRAVENOUS EVERY 4 HOURS PRN
Status: DISCONTINUED | OUTPATIENT
Start: 2022-07-21 | End: 2022-07-22 | Stop reason: HOSPADM

## 2022-07-21 RX ORDER — FENTANYL CITRATE 50 UG/ML
INJECTION, SOLUTION INTRAMUSCULAR; INTRAVENOUS
Status: DISCONTINUED | OUTPATIENT
Start: 2022-07-21 | End: 2022-07-21

## 2022-07-21 RX ORDER — ROCURONIUM BROMIDE 10 MG/ML
INJECTION, SOLUTION INTRAVENOUS
Status: DISCONTINUED | OUTPATIENT
Start: 2022-07-21 | End: 2022-07-21

## 2022-07-21 RX ADMIN — ROCURONIUM BROMIDE 5 MG: 10 INJECTION INTRAVENOUS at 09:07

## 2022-07-21 RX ADMIN — HYDROMORPHONE HYDROCHLORIDE 0.2 MG: 1 INJECTION, SOLUTION INTRAMUSCULAR; INTRAVENOUS; SUBCUTANEOUS at 03:07

## 2022-07-21 RX ADMIN — HYDROMORPHONE HYDROCHLORIDE 0.2 MG: 1 INJECTION, SOLUTION INTRAMUSCULAR; INTRAVENOUS; SUBCUTANEOUS at 02:07

## 2022-07-21 RX ADMIN — LIDOCAINE HYDROCHLORIDE 80 MG: 20 INJECTION, SOLUTION EPIDURAL; INFILTRATION; INTRACAUDAL; PERINEURAL at 09:07

## 2022-07-21 RX ADMIN — ONDANSETRON 4 MG: 2 INJECTION INTRAMUSCULAR; INTRAVENOUS at 01:07

## 2022-07-21 RX ADMIN — MORPHINE SULFATE 1 MG: 2 INJECTION, SOLUTION INTRAMUSCULAR; INTRAVENOUS at 10:07

## 2022-07-21 RX ADMIN — EPHEDRINE SULFATE 5 MG: 50 INJECTION INTRAVENOUS at 10:07

## 2022-07-21 RX ADMIN — HYDROCODONE BITARTRATE AND ACETAMINOPHEN 15 ML: 7.5; 325 SOLUTION ORAL at 02:07

## 2022-07-21 RX ADMIN — CALCIUM CARBONATE (ANTACID) CHEW TAB 500 MG 2000 MG: 500 CHEW TAB at 09:07

## 2022-07-21 RX ADMIN — ACETAMINOPHEN 1000 MG: 500 TABLET ORAL at 08:07

## 2022-07-21 RX ADMIN — FENTANYL CITRATE 25 MCG: 50 INJECTION INTRAMUSCULAR; INTRAVENOUS at 01:07

## 2022-07-21 RX ADMIN — SUCCINYLCHOLINE CHLORIDE 100 MG: 20 INJECTION, SOLUTION INTRAMUSCULAR; INTRAVENOUS; PARENTERAL at 09:07

## 2022-07-21 RX ADMIN — FENTANYL CITRATE 50 MCG: 50 INJECTION INTRAMUSCULAR; INTRAVENOUS at 01:07

## 2022-07-21 RX ADMIN — MORPHINE SULFATE 1 MG: 2 INJECTION, SOLUTION INTRAMUSCULAR; INTRAVENOUS at 04:07

## 2022-07-21 RX ADMIN — REMIFENTANIL HYDROCHLORIDE 0.22 MCG/KG/MIN: 1 INJECTION, POWDER, LYOPHILIZED, FOR SOLUTION INTRAVENOUS at 09:07

## 2022-07-21 RX ADMIN — Medication 125 MCG/KG/MIN: at 09:07

## 2022-07-21 RX ADMIN — SCOPALAMINE 1 PATCH: 1 PATCH, EXTENDED RELEASE TRANSDERMAL at 08:07

## 2022-07-21 RX ADMIN — SODIUM CHLORIDE, SODIUM GLUCONATE, SODIUM ACETATE, POTASSIUM CHLORIDE, MAGNESIUM CHLORIDE, SODIUM PHOSPHATE, DIBASIC, AND POTASSIUM PHOSPHATE: .53; .5; .37; .037; .03; .012; .00082 INJECTION, SOLUTION INTRAVENOUS at 09:07

## 2022-07-21 RX ADMIN — POTASSIUM CHLORIDE, DEXTROSE MONOHYDRATE AND SODIUM CHLORIDE: 150; 5; 900 INJECTION, SOLUTION INTRAVENOUS at 02:07

## 2022-07-21 RX ADMIN — TACROLIMUS 900 MG: 0.5 CAPSULE ORAL at 09:07

## 2022-07-21 RX ADMIN — SODIUM CHLORIDE: 9 INJECTION, SOLUTION INTRAVENOUS at 09:07

## 2022-07-21 RX ADMIN — FENTANYL CITRATE 100 MCG: 50 INJECTION INTRAMUSCULAR; INTRAVENOUS at 09:07

## 2022-07-21 RX ADMIN — DEXAMETHASONE SODIUM PHOSPHATE 4 MG: 4 INJECTION INTRA-ARTICULAR; INTRALESIONAL; INTRAMUSCULAR; INTRAVENOUS; SOFT TISSUE at 10:07

## 2022-07-21 RX ADMIN — PROPOFOL 140 MG: 10 INJECTION, EMULSION INTRAVENOUS at 09:07

## 2022-07-21 RX ADMIN — MIDAZOLAM 2 MG: 1 INJECTION INTRAMUSCULAR; INTRAVENOUS at 09:07

## 2022-07-21 RX ADMIN — HYDROCODONE BITARTRATE AND ACETAMINOPHEN 15 ML: 7.5; 325 SOLUTION ORAL at 07:07

## 2022-07-21 RX ADMIN — EPHEDRINE SULFATE 5 MG: 50 INJECTION INTRAVENOUS at 09:07

## 2022-07-21 NOTE — ANESTHESIA POSTPROCEDURE EVALUATION
Anesthesia Post Evaluation    Patient: Brennan Christine    Procedure(s) Performed: Procedure(s) (LRB):  THYROIDECTOMY (Bilateral)    Final Anesthesia Type: general      Patient location during evaluation: PACU  Patient participation: Yes- Able to Participate  Level of consciousness: awake and alert  Post-procedure vital signs: reviewed and stable  Pain management: adequate  Airway patency: patent    PONV status at discharge: No PONV  Anesthetic complications: no      Cardiovascular status: blood pressure returned to baseline  Respiratory status: unassisted  Hydration status: euvolemic  Follow-up not needed.          Vitals Value Taken Time   /87 07/21/22 1531   Temp 36.5 °C (97.7 °F) 07/21/22 1530   Pulse 74 07/21/22 1544   Resp 18 07/21/22 1615   SpO2 100 % 07/21/22 1544   Vitals shown include unvalidated device data.      Event Time   Out of Recovery 14:45:00         Pain/Jaycee Score: Pain Rating Prior to Med Admin: 7 (7/21/2022  4:15 PM)  Jaycee Score: 10 (7/21/2022  2:45 PM)

## 2022-07-21 NOTE — BRIEF OP NOTE
[facility] (2nd Fl)  Brief Operative Note     SUMMARY     Surgery Date:   7/21/2022     SURGEON(S):   Surgeon(s) and Role:     * Iglesia Vale MD - Primary     * Shlomo Spann MD - Resident - Assisting    ANESTHESIA STAFF:   Javier Ba MD    OR STAFF:   Circulator: Miryam Becker RN  Relief Circulator: Cortney Alva RN  Relief Scrub: Libra Abarca  Scrub Person: ST Agapito      Pre-op Diagnosis:  Papillary thyroid carcinoma [C73]    Post-op Diagnosis:  Post-Op Diagnosis Codes:     * Papillary thyroid carcinoma [C73]    Procedure(s) (LRB):  THYROIDECTOMY (Bilateral)  Bilateral paratracheal and superior mediastinal lymphadenectomy  Reimplantation of the left inferior parathyroid into the left SCM    Anesthesia: General    Description of the procedure: total thyroidectomy with central neck dissection.     Findings: Identification and preservation of right superior parathyroid gland, re-implantation of left superior parathyroid    Estimated Blood Loss: 20cc         Specimens:   Specimen (24h ago, onward)                 Start     Ordered    07/21/22 1301  Specimen to Pathology, Surgery ENT  Once        Comments: 1) Right superior parathyroid - FROZEN SECTION2) right deep paratracheal - permanent3) Left Inferior Parathyroid - Frozen4) Left Deep Paratracheal - Permanent5) thyroid, mediastinal lymphadenectomies with stitch in right superior pole - permanent     References:    Click here for ordering Quick Tip   Question Answer Comment   Procedure Type: ENT    Specimen Class: Known or suspected malignancy    Release to patient Immediate        07/21/22 1301                  As above  I was present for and participated in all aspects of this operation.   LENGTH OF SURGERY:   * Missing case tracking time(s) *    Event Time In   In Facility 0714   In Pre-Procedure 0755   Physician Available    Anesthesia Available    Pre-Op: Bedside Procedure Start    Pre-Op: Bedside Procedure Stop     Pre-Procedure Complete 0826   Out of Pre-Procedure    Holding Start    Holding Stop    Anesthesia Start 0909   Anesthesia Start Data Collection    Setup Start    Setup Complete 0843   In Room 0907   Prep Start    Procedure Prep Complete    Procedure Start 0952   Procedure Closing 1258   Emergence    Procedure Finish 1337   Out of Room    In OR Recovery    Out of OR Recovery    Cleanup Start    Cleanup Complete    Cosmetic Start    Cosmetic Stop    Pain Mgmt In Room    Pain Mgmt Out Room    In Recovery    Anesthesia Finish    Bedside Procedure Start    Bedside Procedure Stop    Recovery Care Complete    Out of Recovery    In Diagnostic Recovery    Out Diagnostic Recovery    In PACU Phase II    Out PACU Phase II    In PACU Ext    Out PACU Ext    In Recovery DOSC    Out Recovery DOSC    Obs Rec Start    Obs Rec Stop    To Phase II    In Phase II    Phase II Care Complete    Out of Phase II    In Phase II Ext    Out Phase II Ext    Procedural Care Complete    Pain Follow Up Needed    Pain Follow Up Complete    PACU Bed Request

## 2022-07-21 NOTE — ANESTHESIA PROCEDURE NOTES
Intubation    Date/Time: 7/21/2022 9:24 AM  Performed by: Parminder Jensen MD  Authorized by: Javier Ba MD     Intubation:     Induction:  Intravenous    Intubated:  Postinduction    Mask Ventilation:  Easy with oral airway    Attempts:  1    Attempted By:  Resident anesthesiologist    Method of Intubation:  Direct    Blade:  Childers 3    Laryngeal View Grade: Grade I - full view of cords      Difficult Airway Encountered?: No      Complications:  None    Airway Device:  EMG ETT (NIMS)    Airway Device Size:  7.0    Style/Cuff Inflation:  Cuffed (inflated to minimal occlusive pressure)    Tube secured:  22    Secured at:  The teeth    Placement Verified By:  Capnometry    Complicating Factors:  None    Findings Post-Intubation:  BS equal bilateral and atraumatic/condition of teeth unchanged

## 2022-07-21 NOTE — TRANSFER OF CARE
Anesthesia Transfer of Care Note    Patient: Brennan Christine    Procedure(s) Performed: Procedure(s) (LRB):  THYROIDECTOMY (Bilateral)    Patient location: PACU    Anesthesia Type: general    Transport from OR: Transported from OR on 6-10 L/min O2 by face mask with adequate spontaneous ventilation    Post pain: adequate analgesia    Post assessment: no apparent anesthetic complications    Post vital signs: stable    Level of consciousness: awake and alert    Nausea/Vomiting: no nausea/vomiting    Complications: none    Transfer of care protocol was followed      Last vitals:   Visit Vitals  /81 (BP Location: Right arm, Patient Position: Lying)   Pulse 70   Temp 36.7 °C (98 °F) (Oral)   Resp 18   Wt 88.5 kg (195 lb)   LMP  (LMP Unknown)   SpO2 99%   Breastfeeding No   BMI 30.54 kg/m²

## 2022-07-21 NOTE — NURSING TRANSFER
Nursing Transfer Note      7/21/2022     Reason patient is being transferred: post procedure     Transfer To: 1047    Transfer via bed    Transported by transporters x2    Medicines sent: none     Any special needs or follow-up needed: routine     Chart send with patient: Yes    Notified: mother     Patient reassessed at: 1500 on 7/21

## 2022-07-21 NOTE — OP NOTE
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 female who was noted to have a right thyroid mass that was found to be consistent with papillary thyroid carcinoma on FNA, along with some paratracheal adenopathy on both US and MRI.     The risks, benefits, indications, and alternatives to this procedure were thoroughly discussed with the patient preoperatively, and informed consent was obtained. Please see my detailed preoperative notes for a thorough account of this discussion.    Findings:   There was a firm right thyroid mass that was seemingly anterior near the straps, so the sternothyroid muscle was resected en bloc. There was significant bilateral, firm paratracheal adenopathy extending down to the innominate. Adenopathy was both dorsal and ventral to both RLNs. Both recurrent laryngeal nerves were identified and   preserved. The right one stimulated at 1 milliampere of stimulus on the nerve integrity monitor system to over 550 microvolts, while the left one stimulated to over 550 microvolts at the same stimulus intensity. Bilateral superior parathyroid glands were identified and preserved, and the left inferior gland was identified ex vivo, morselized, and reimplanted in the left SCM.     Detailed Account of Technique Employed:  The patient was identified in the holding area per routine. She was transported to the Operating Room and placed supine on the operating table. She was intubated transorally with the  nerve monitoring endotracheal tube and an adequate level of general endotracheal anesthesia was achieved. Adequate functioning of the nerve monitoring tube was determined by positive responses when the patient became light with anesthesia, as there was evidence of neuromuscular activity, and with direct palpation of the thyroid ala, which gave an appropriate stimulus response on the side of palpation. The anterior neck and chest were then prepped and draped in the standard fashion. A timeout was performed according to the Universal protocol. A suitable skin crease located about 2 fingerbreadths above the clavicle was then marked and injected with 1% lidocaine with 1:100,000 epinephrine.     A 6 cm incision was then made within the skin crease (the only one was very low in the neck, hence the longer than usual incision) through the skin and subcutaneous tissues. The platysma was divided laterally and the superficial layer of cervical fascia divided in the midline. Subplatysmal flaps were elevated to the thyroid notch superiorly and to the sternal notch inferiorly. The median raphe between the strap muscles was identified and . The straps were then elevated off the right thyroid lobe. The superior pole was identified. The cricothyroid space was developed. The superior pedicle was then doubly clamped, divided and ligated with 2-0 and 3-0 silk after visualizing branches of the external branch of the superior laryngeal nerve. The gland was rotated medially. Multiple fibrovascular attachments of the gland in the region of the middle thyroid vein were then either clipped or controlled with a bipolar. The gland was rotated even further medially. The tubercle of Zuckerkandl was identified, and the superior parathyroid gland was identified and swept laterally, confirming it on frozen section. The recurrent nerve was identified coursing obliquely through the right paratracheal gutter, just deep to the tubercle, and it  "was dissected both distally and proximally towards the cricothyroid joint and under the innominate, respectively, dividing the intervening fibrovascular attachments of the gland to the central neck and  planes between multiple enlarged, suspicious lymph nodes. The nerve was skeletonized, and the "deep" or lateral nodes were dissected off the CCA and the esophagus as a discrete packet. The medial nodes and ventral nodes were reflected medially, and we followed the CCA across the midline as the innominate, reflecting nodes and some thymic remnant superiorly. This siobhan packet was removed en bloc with the thyroid. There was some infiltration of Berry's ligament by thyroid tissue, and the ligament was divided over the recurrent nerve. All gross siobhan disease and thyroid tissue was removed under loupe magnification. Because of the significant adenopathy, the right inferior parathyroid gland was not preserved. The superior gland was healthy appearing in situ with intact blood supply. The anterior fibrovascular attachments of the gland to the trachea were then divided with the bipolar and/or small surgical clips and it was elevated off the trachea. Distal branches of the inferior thyroid artery were controlled with either small surgical clips, 3-0 ties or the bipolar as the right lobe was rotated medially.     Attention was then turned to the left lobe. Again, the straps were elevated off the left lobe and the superior pole visualized. The cricothyroid space was developed. The superior pedicle was doubly clamped, divided and ligated with 2-0 silk. On both sides, it should be noted that care was taken to visually identify and preserve the external branch of the superior laryngeal nerve. I then dissected the left CCA into the superior mediastinum and connected across the midline to our prior dissection. The fibrofatty tissue was reflected medially, and the left recurrent laryngeal nerve was identified coursing " "through the tracheoesophageal groove and the intervening fibrovascular attachments of the gland to the central neck superficial to the nerve were then divided with the bipolar cautery. Nodes were predominately swept medially off the floor of the neck and esophagus over the nerve, but a small packet was freed from the nerve and the esophagus and sent as a "deep" packet. The left lobe was then rotated further medially. The superior and inferior parathyroid glands were then swept laterally off the thyroid gland capsule. The left inferior one was adjacent to nodes, but not involved - it was confirmed on frozen section, then morselized and implanted in a pocket in the left SCM. THe thyroid lobe and paratracheal nodes, down to and including a cuff of the thymic remnant, were reflected medially and dissected off the nerve. Again, there was some infiltration of Berry's ligament by thyroidal tissue and this was divided with the bipolar as it was elevated off the anterior tracheal wall. Again, distal branches of the inferior thyroid artery were sacrificed by using small surgical clips, 3-0 silk ties, or the bipolar. The remaining fibrovascular attachments of the gland to the cricotracheal complex were then divided. There was an appreciable pyramidal lobe as well as some Delphian lymph nodes, and this was all resected from their attachments to the superior strap muscles cephalad to the hyoid, where the stalk was clamped and ligated with 2-0 silk. The gland was amputated and inspected ex-vivo. There was no parathyroidal tissue identified on the specimen. Both recurrent nerves were then stimulated with the nerve integrity monitor system at 1 milliampere of stimulus intensity as noted above, and the operative field was clean, leaving only the essential structures.    The wound was copiously irrigated with saline and additional hemostasis was achieved as necessary with focal application of the bipolar cautery. Some Surgicel was " placed in the bilateral paratracheal gutters. Multiple Valsalva maneuvers were performed as noted above. A 15Fr round drain was placed in the neck. The fascia of the strap muscles was reapproximated in the midline using interrupted 3-0 Vicryl. Interrupted 3-0 Vicryl was used in the platysma, followed by deep dermal buried sutures of 4-0 Monocryl. Dermabond was applied to the skin. The patient was then allowed to awaken from anesthesia, extubated and transported to the Recovery Room in stable condition.     All sponge, needle and instrument counts were correct at the end of case x2.     I was present for and performed all portions of this operation.

## 2022-07-22 VITALS
WEIGHT: 195 LBS | SYSTOLIC BLOOD PRESSURE: 122 MMHG | RESPIRATION RATE: 18 BRPM | HEIGHT: 67 IN | BODY MASS INDEX: 30.61 KG/M2 | OXYGEN SATURATION: 98 % | TEMPERATURE: 98 F | HEART RATE: 68 BPM | DIASTOLIC BLOOD PRESSURE: 78 MMHG

## 2022-07-22 LAB
CA-I BLDV-SCNC: 1.11 MMOL/L (ref 1.06–1.42)
PTH-INTACT SERPL-MCNC: <5 PG/ML (ref 9–77)

## 2022-07-22 PROCEDURE — 36415 COLL VENOUS BLD VENIPUNCTURE: CPT | Performed by: STUDENT IN AN ORGANIZED HEALTH CARE EDUCATION/TRAINING PROGRAM

## 2022-07-22 PROCEDURE — 63600175 PHARM REV CODE 636 W HCPCS: Performed by: STUDENT IN AN ORGANIZED HEALTH CARE EDUCATION/TRAINING PROGRAM

## 2022-07-22 PROCEDURE — 82330 ASSAY OF CALCIUM: CPT | Performed by: STUDENT IN AN ORGANIZED HEALTH CARE EDUCATION/TRAINING PROGRAM

## 2022-07-22 PROCEDURE — 83970 ASSAY OF PARATHORMONE: CPT | Performed by: STUDENT IN AN ORGANIZED HEALTH CARE EDUCATION/TRAINING PROGRAM

## 2022-07-22 PROCEDURE — 25000003 PHARM REV CODE 250: Performed by: STUDENT IN AN ORGANIZED HEALTH CARE EDUCATION/TRAINING PROGRAM

## 2022-07-22 RX ORDER — CALCITRIOL 0.25 UG/1
0.25 CAPSULE ORAL DAILY
Qty: 30 CAPSULE | Refills: 0 | Status: SHIPPED | OUTPATIENT
Start: 2022-07-23 | End: 2022-08-19 | Stop reason: SDUPTHER

## 2022-07-22 RX ORDER — CALCIUM CARBONATE 200(500)MG
2000 TABLET,CHEWABLE ORAL 3 TIMES DAILY
Qty: 360 TABLET | Refills: 11 | Status: SHIPPED | OUTPATIENT
Start: 2022-07-22 | End: 2022-09-20 | Stop reason: SDUPTHER

## 2022-07-22 RX ORDER — HYDROCODONE BITARTRATE AND ACETAMINOPHEN 7.5; 325 MG/15ML; MG/15ML
15 SOLUTION ORAL EVERY 8 HOURS PRN
Qty: 118 ML | Refills: 0 | Status: SHIPPED | OUTPATIENT
Start: 2022-07-22 | End: 2022-07-22 | Stop reason: SDUPTHER

## 2022-07-22 RX ORDER — LEVOTHYROXINE SODIUM 112 UG/1
112 TABLET ORAL
Qty: 30 TABLET | Refills: 11 | Status: SHIPPED | OUTPATIENT
Start: 2022-07-23 | End: 2022-09-20 | Stop reason: SDUPTHER

## 2022-07-22 RX ORDER — HYDROCODONE BITARTRATE AND ACETAMINOPHEN 7.5; 325 MG/15ML; MG/15ML
15 SOLUTION ORAL EVERY 8 HOURS PRN
Qty: 118 ML | Refills: 0 | Status: SHIPPED | OUTPATIENT
Start: 2022-07-22 | End: 2022-07-24 | Stop reason: SDUPTHER

## 2022-07-22 RX ADMIN — CALCIUM CARBONATE (ANTACID) CHEW TAB 500 MG 2000 MG: 500 CHEW TAB at 03:07

## 2022-07-22 RX ADMIN — HYDROCODONE BITARTRATE AND ACETAMINOPHEN 15 ML: 7.5; 325 SOLUTION ORAL at 12:07

## 2022-07-22 RX ADMIN — MORPHINE SULFATE 1 MG: 2 INJECTION, SOLUTION INTRAMUSCULAR; INTRAVENOUS at 09:07

## 2022-07-22 RX ADMIN — HYDROCODONE BITARTRATE AND ACETAMINOPHEN 15 ML: 7.5; 325 SOLUTION ORAL at 05:07

## 2022-07-22 RX ADMIN — CALCIUM CARBONATE (ANTACID) CHEW TAB 500 MG 2000 MG: 500 CHEW TAB at 10:07

## 2022-07-22 RX ADMIN — CALCITRIOL CAPSULES 0.25 MCG 0.25 MCG: 0.25 CAPSULE ORAL at 10:07

## 2022-07-22 RX ADMIN — HYDROCODONE BITARTRATE AND ACETAMINOPHEN 15 ML: 7.5; 325 SOLUTION ORAL at 01:07

## 2022-07-22 RX ADMIN — LEVOTHYROXINE SODIUM 112 MCG: 112 TABLET ORAL at 05:07

## 2022-07-22 NOTE — DISCHARGE INSTRUCTIONS
Go to the ENT clinic on Monday 7/25 for drain removal     General Information  - Take all medications as prescribed  -No driving while taking narcotic pain medications  -Do not take any OTC products containing acetaminophen at the same time as you take your narcotic pain medication. Medications that may contain acetaminophen include but are not limited to: Excedrin and other headache medications, arthritis medications, cold and sinus medications, etc. Please review the list of active ingredients in any OTC medication prior to taking it.    Wound Care  - Okay to shower 24 hours after surgery. Have stream of water hit opposite your incision. Pat gently to dry.   - No diet restrictions   - Skin glue. Dont put liquid, ointment, or cream on your wound while the glue is in place. Avoid activities that cause heavy sweating. Protect the wound from sunlight. Do not scratch, rub, or pick at the glue. Do not put tape directly over the glue. The glue should peel off within 5 to 10 days.  - Sutures: Your incision was closed with dissolvable sutures. They do not need to be removed.     When to seek medical care  Call your healthcare provider if you have any of the following:  Bleeding or foul-smelling discharge around the incision area  Significant neck swelling, especially if accompanied by worsening difficulty with breathing or swallowing  Fever of 101.4°F or higher  Shaking chills  Vomiting or nausea that doesn't go away  Numbness, coldness, or tingling around the incision area, or changes in skin color  Opening of the sutures or wound

## 2022-07-22 NOTE — PLAN OF CARE
Plan of care reviewed with pt. Verbalized understanding. Pt AAOx4. VS stable on RA. Pain managed with PRN medicine.     - Neck incision w/ DB CDI.  - R neck JUDY intact w/ sanguinous drainage.  - MIVF infusing per MAR.  - Pt ambulates to BR stand by assist, adequate output.     Pt tolerating regular diet. No complaints of nausea. Frequent rounds made for pt safety. Bed low and locked, call light in reach. Will continue to manage POC.             Problem: Adult Inpatient Plan of Care  Goal: Plan of Care Review  Outcome: Ongoing, Progressing  Goal: Patient-Specific Goal (Individualized)  Outcome: Ongoing, Progressing  Goal: Absence of Hospital-Acquired Illness or Injury  Outcome: Ongoing, Progressing  Goal: Optimal Comfort and Wellbeing  Outcome: Ongoing, Progressing  Goal: Readiness for Transition of Care  Outcome: Ongoing, Progressing     Problem: Fall Injury Risk  Goal: Absence of Fall and Fall-Related Injury  Outcome: Ongoing, Progressing

## 2022-07-22 NOTE — DISCHARGE SUMMARY
Otorhinolaryngology-Head & Neck Surgery  [address] [facility]  Discharge Summary      Patient Name: Brennan Christine  MRN: [id number]  Admission Date: 7/21/2022  Discharge Date and Time: 07/22/2022 1:06 PM   Hospital Length of Stay: 0 days   Attending Physician: Iglesia Vale MD   Discharging Provider: Shlomo Spann DO      Reason for Hospitalization: Elective inpatient surgery    Diagnoses:   1. Papillary thyroid carcinoma        Procedure(s):  THYROIDECTOMY with central neck dissection    Hospital Course:   Please see the preoperative H&P and other available documentation for full details related to history prior to this admission.  Briefly, Brennan Christine was admitted following scheduled elective surgery. Following a complete preoperative discussion of the risks and benefits of surgery with signed informed consent, the patient was taken to the operating room on 7/21/2022 and underwent the above stated procedures.  The patient tolerated surgery well and there were no complications.  Please see the operative report for full intraoperative findings and details.  Postoperatively, the patient did well and was transferred from the PACU to the floor in stable condition where they had a stable and uncomplicated hospital course. Vital signs remained stable and appropriate throughout course.  Diet was advanced as tolerated and the patient's pain was controlled on oral pain medications without problem.  Currently, the patient is doing well at 1 Day Post-Op and is stable and appropriate for discharge home at this time.      Disposition: Home or Self Care    Discharge Instructions:      Diet Adult Regular     Notify your health care provider if you experience any of the following:  temperature >100.4     Notify your health care provider if you experience any of the following:  persistent nausea and vomiting or diarrhea     Notify your health care provider if you experience any of the following:  severe uncontrolled  pain     Notify your health care provider if you experience any of the following:  difficulty breathing or increased cough     Notify your health care provider if you experience any of the following:  redness, tenderness, or signs of infection (pain, swelling, redness, odor or green/yellow discharge around incision site)     Notify your health care provider if you experience any of the following:  increased confusion or weakness     Notify your health care provider if you experience any of the following:  persistent dizziness, light-headedness, or visual disturbances     Notify your health care provider if you experience any of the following:  worsening rash     No dressing needed       Reconciled Medications:      Medication List      START taking these medications    calcitRIOL 0.25 MCG Cap  Commonly known as: ROCALTROL  Take 1 capsule (0.25 mcg total) by mouth once daily.  Start taking on: July 23, 2022     calcium carbonate 200 mg calcium (500 mg) chewable tablet  Commonly known as: TUMS  Take 4 tablets (2,000 mg total) by mouth 3 (three) times daily.     hydrocodone-apap 7.5-325 MG/15 ML oral solution  Commonly known as: HYCET  Take 15 mLs by mouth every 8 (eight) hours as needed.     levothyroxine 112 MCG tablet  Commonly known as: SYNTHROID  Take 1 tablet (112 mcg total) by mouth before breakfast.  Start taking on: July 23, 2022        CONTINUE taking these medications    diazePAM 10 MG Tab  Commonly known as: VALIUM  Take 1 tablet (10 mg total) by mouth once. for 1 dose     MIRENA IU  Mirena Take No date recorded No form recorded No frequency recorded No route recorded No set duration recorded No set duration amount recorded suspended No dosage strength recorded No dosage strength units of measure recorded            Follow Up:    Follow-up Information     Gianna Melendrez NP. Go on 7/25/2022.    Specialty: Otolaryngology  Why: for drain removal  Contact information:  [address]  [facility]  [address]  [phone]                         Discharged Condition: Good      Shlomo Spann,   Otorhinolaryngology-Head & Neck Surgery  [facility]  07/22/2022

## 2022-07-22 NOTE — PROGRESS NOTES
Otorhinolaryngology-Head & Neck Surgery  [facility]  Progress Note    Patient Name: Brennan Christine  MRN: [id number]  Admission Date: 7/21/2022  Attending Physician: Iglesia Vale MD    Subjective:     Interval History: no issues overnight. Some soreness, otherwise no complaints.       Objective:     Vital Signs (24h Range):  Temp:  [97.7 °F (36.5 °C)-98.1 °F (36.7 °C)] 98 °F (36.7 °C)  Pulse:  [53-78] 53  Resp:  [12-27] 16  SpO2:  [95 %-100 %] 95 %  BP: (104-157)/(55-95) 111/72       Intake/Output Summary (Last 24 hours) at 7/22/2022 0729  Last data filed at 7/22/2022 0600  Gross per 24 hour   Intake 1700 ml   Output 55 ml   Net 1645 ml       Awake, Alert and Oriented. NAD, E4V5M6  Pupils equal, round & brisk.    Vision grossly intact, Hearing grossly intact  External ears normal, external nose normal  Face symmetric, non-edematous  Neck is symmetric, incision c/d/i, drain in place  Normal work of breathing, no stridor  Good phonation    Output by Drain (mL) 07/20/22 0701 - 07/20/22 1900 07/20/22 1901 - 07/21/22 0700 07/21/22 0701 - 07/21/22 1900 07/21/22 1901 - 07/22/22 0700 07/22/22 0701 - 07/22/22 0729        Closed/Suction Drain 07/21/22 1247 Anterior Neck Bulb 10 Fr.   25 30            Assessment/Plan:       S/p total thyroidectomy with central neck dissection    Will discharge today with drain, rocaltrol and calcium supplementation    Sholmo Spann, DO  Otorhinolaryngology-Head & Neck Surgery  [facility]  07/22/2022    I have reviewed the notes, assessments, and/or procedures performed by Dr. Spann, I concur with her/his documentation of Brennan Christine.    Incision intact  Voice with mild diplophonia (excellent responses on the NIM)  PTH < 5 with normal calcium    Calcium replacement, rocaltrol, synthroid, and pain medication  FU for drain removal Monday

## 2022-07-22 NOTE — PLAN OF CARE
Pt arrived from pacu Aox4. VSS. No signs of respiratory distress on RA. JUDY drain in place Pt remained injury free through shift. Will continue POC.

## 2022-07-24 RX ORDER — HYDROCODONE BITARTRATE AND ACETAMINOPHEN 7.5; 325 MG/15ML; MG/15ML
15 SOLUTION ORAL EVERY 8 HOURS PRN
Qty: 118 ML | Refills: 0 | Status: SHIPPED | OUTPATIENT
Start: 2022-07-24 | End: 2022-07-24

## 2022-07-24 RX ORDER — HYDROCODONE BITARTRATE AND ACETAMINOPHEN 7.5; 325 MG/15ML; MG/15ML
15 SOLUTION ORAL EVERY 8 HOURS PRN
Qty: 118 ML | Refills: 0 | Status: SHIPPED | OUTPATIENT
Start: 2022-07-24 | End: 2022-10-17

## 2022-07-25 ENCOUNTER — OFFICE VISIT (OUTPATIENT)
Dept: OTOLARYNGOLOGY | Facility: CLINIC | Age: 27
End: 2022-07-25
Payer: COMMERCIAL

## 2022-07-25 ENCOUNTER — LAB VISIT (OUTPATIENT)
Dept: LAB | Facility: HOSPITAL | Age: 27
End: 2022-07-25
Attending: NURSE PRACTITIONER
Payer: COMMERCIAL

## 2022-07-25 ENCOUNTER — PATIENT MESSAGE (OUTPATIENT)
Dept: OTOLARYNGOLOGY | Facility: CLINIC | Age: 27
End: 2022-07-25

## 2022-07-25 ENCOUNTER — NURSE TRIAGE (OUTPATIENT)
Dept: ADMINISTRATIVE | Facility: CLINIC | Age: 27
End: 2022-07-25
Payer: COMMERCIAL

## 2022-07-25 VITALS
DIASTOLIC BLOOD PRESSURE: 79 MMHG | HEART RATE: 88 BPM | BODY MASS INDEX: 30.54 KG/M2 | SYSTOLIC BLOOD PRESSURE: 118 MMHG | WEIGHT: 195 LBS

## 2022-07-25 DIAGNOSIS — C73 PAPILLARY THYROID CARCINOMA: ICD-10-CM

## 2022-07-25 DIAGNOSIS — C73 PAPILLARY THYROID CARCINOMA: Primary | ICD-10-CM

## 2022-07-25 LAB
CA-I BLDV-SCNC: 1.13 MMOL/L (ref 1.06–1.42)
CALCIUM SERPL-MCNC: 8.6 MG/DL (ref 8.7–10.5)
PTH-INTACT SERPL-MCNC: <5 PG/ML (ref 9–77)

## 2022-07-25 PROCEDURE — 3074F PR MOST RECENT SYSTOLIC BLOOD PRESSURE < 130 MM HG: ICD-10-PCS | Mod: CPTII,S$GLB,, | Performed by: NURSE PRACTITIONER

## 2022-07-25 PROCEDURE — 82330 ASSAY OF CALCIUM: CPT | Performed by: NURSE PRACTITIONER

## 2022-07-25 PROCEDURE — 3074F SYST BP LT 130 MM HG: CPT | Mod: CPTII,S$GLB,, | Performed by: NURSE PRACTITIONER

## 2022-07-25 PROCEDURE — 36415 COLL VENOUS BLD VENIPUNCTURE: CPT | Performed by: NURSE PRACTITIONER

## 2022-07-25 PROCEDURE — 3078F DIAST BP <80 MM HG: CPT | Mod: CPTII,S$GLB,, | Performed by: NURSE PRACTITIONER

## 2022-07-25 PROCEDURE — 3008F BODY MASS INDEX DOCD: CPT | Mod: CPTII,S$GLB,, | Performed by: NURSE PRACTITIONER

## 2022-07-25 PROCEDURE — 3078F PR MOST RECENT DIASTOLIC BLOOD PRESSURE < 80 MM HG: ICD-10-PCS | Mod: CPTII,S$GLB,, | Performed by: NURSE PRACTITIONER

## 2022-07-25 PROCEDURE — 99999 PR PBB SHADOW E&M-EST. PATIENT-LVL II: ICD-10-PCS | Mod: PBBFAC,,, | Performed by: NURSE PRACTITIONER

## 2022-07-25 PROCEDURE — 99999 PR PBB SHADOW E&M-EST. PATIENT-LVL II: CPT | Mod: PBBFAC,,, | Performed by: NURSE PRACTITIONER

## 2022-07-25 PROCEDURE — 99024 POSTOP FOLLOW-UP VISIT: CPT | Mod: S$GLB,,, | Performed by: NURSE PRACTITIONER

## 2022-07-25 PROCEDURE — 3008F PR BODY MASS INDEX (BMI) DOCUMENTED: ICD-10-PCS | Mod: CPTII,S$GLB,, | Performed by: NURSE PRACTITIONER

## 2022-07-25 PROCEDURE — 82310 ASSAY OF CALCIUM: CPT | Performed by: NURSE PRACTITIONER

## 2022-07-25 PROCEDURE — 99024 PR POST-OP FOLLOW-UP VISIT: ICD-10-PCS | Mod: S$GLB,,, | Performed by: NURSE PRACTITIONER

## 2022-07-25 PROCEDURE — 83970 ASSAY OF PARATHORMONE: CPT | Performed by: NURSE PRACTITIONER

## 2022-07-25 NOTE — TELEPHONE ENCOUNTER
Pt called and spoke to mom and pt had thyroidectomy out last week and she is constipated on pain meds and just started laxatives yesterday no BM in > 4 days and mom given care advice and will call and make appt if home solutions dont work. Pt to call back if any abd swelling and severe abd pain for over 2hours will route to provider   Reason for Disposition   Last bowel movement (BM) > 4 days ago    Additional Information   Negative: Patient sounds very sick or weak to the triager   Negative: [1] Vomiting AND [2] abdomen looks much more swollen than usual   Negative: [1] Vomiting AND [2] contains bile (green color)   Negative: [1] Constant abdominal pain AND [2] present > 2 hours   Negative: [1] Rectal pain or fullness from fecal impaction (rectum full of stool) AND [2] NOT better after SITZ bath, suppository or enema   Negative: [1] Intermittent mild abdominal pain AND [2] fever   Negative: Abdomen is more swollen than usual    Protocols used: CONSTIPATION-A-AH

## 2022-07-25 NOTE — PROGRESS NOTES
Subjective:       Patient ID: Brennan Christine is a 27 y.o. female.    Chief Complaint: drain removal    HPI     Brennan Christine returns for a post op visit. She has been doing well since surgery. Her pain is well controlled. She is taking 2000mg calcium TID and 0.25mcg calcitriol qd. She has intermittent numbness and tingling to her hands and feet. Drain output has been <30ml in 24 hours. No complaints.    Past Medical History:   Diagnosis Date    Chronic back pain     Complication of anesthesia Reglan allergy    Papillary thyroid carcinoma 07/18/2022    Venous malformation     Left Arm       Past Surgical History:   Procedure Laterality Date    ADENOIDECTOMY      SCLEROTHERAPY WITH ULTRASOUND GUIDANCE      THYROIDECTOMY Bilateral 7/21/2022    Procedure: THYROIDECTOMY;  Surgeon: Iglesia Vale MD;  Location: [facility] OR [address] [place] [address];  Service: ENT;  Laterality: Bilateral;  NIM tube. [facility] in (or from) [facility], possible lateral neck dissection    TONSILLECTOMY           Current Outpatient Medications:     calcitRIOL (ROCALTROL) 0.25 MCG Cap, Take 1 capsule (0.25 mcg total) by mouth once daily., Disp: 30 capsule, Rfl: 0    calcium carbonate (TUMS) 200 mg calcium (500 mg) chewable tablet, Chew and swallow 4 tablets (2,000 mg total) by mouth 3 (three) times daily., Disp: 360 tablet, Rfl: 11    diazePAM (VALIUM) 10 MG Tab, Take 1 tablet (10 mg total) by mouth once. for 1 dose, Disp: 1 tablet, Rfl: 0    hydrocodone-acetaminophen (HYCET) solution 7.5-325 mg/15mL, Take 15 mLs by mouth every 8 (eight) hours as needed for Pain., Disp: 118 mL, Rfl: 0    levonorgestrel (MIRENA IU), Mirena Take No date recorded No form recorded No frequency recorded No route recorded No set duration recorded No set duration amount recorded suspended No dosage strength recorded No dosage strength units of measure recorded, Disp: , Rfl:     levothyroxine (SYNTHROID) 112 MCG tablet, Take 1 tablet (112 mcg total) by  mouth before breakfast., Disp: 30 tablet, Rfl: 11    Review of patient's allergies indicates:   Allergen Reactions    Metoclopramide Nausea And Vomiting, Other (See Comments) and Shortness Of Breath    Niacin Hives    Ceclor [cefaclor]     Benzphetamine Nausea And Vomiting and Other (See Comments)    Sulfamethoxazole-trimethoprim Itching, Nausea And Vomiting, Rash and Other (See Comments)     BACTRIM       Social History     Socioeconomic History    Marital status: Single   Tobacco Use    Smoking status: Current Every Day Smoker     Packs/day: 0.50     Types: Cigarettes, Vaping with nicotine     Last attempt to quit: 2020     Years since quittin.0    Smokeless tobacco: Never Used   Substance and Sexual Activity    Alcohol use: No    Drug use: No    Sexual activity: Not Currently     Partners: Male     Birth control/protection: I.U.D.       Family History   Problem Relation Age of Onset    Hypertension Mother     Diabetes Mellitus Father     Hypertension Maternal Grandmother     Hypertension Maternal Grandfather     Coronary artery disease Maternal Grandfather     Kidney disease Maternal Grandfather          Review of Systems   Constitutional: Negative for appetite change, chills, diaphoresis, fatigue, fever and unexpected weight change.   HENT: Positive for sore throat. Negative for nasal congestion, dental problem, drooling, ear discharge, ear pain, facial swelling, hearing loss, mouth sores, nosebleeds, postnasal drip, rhinorrhea, sinus pressure/congestion, sneezing, tinnitus, trouble swallowing and voice change.    Eyes: Negative for pain, discharge, redness and itching.   Respiratory: Negative for shortness of breath.    Cardiovascular: Negative for chest pain.   Gastrointestinal: Negative for abdominal distention, abdominal pain, diarrhea, nausea and vomiting.   Endocrine: Negative for cold intolerance and heat intolerance.   Genitourinary: Negative for difficulty urinating.    Musculoskeletal: Negative for neck pain and neck stiffness.   Integumentary:  Negative for rash.   Neurological: Negative for dizziness, weakness and headaches.   Hematological: Negative for adenopathy.         Objective:      Physical Exam  Constitutional:       Appearance: Normal appearance.   HENT:      Head: Normocephalic and atraumatic.      Right Ear: External ear normal.      Left Ear: External ear normal.   Neck:      Comments: Incision CDI.  No redness, drainage, or fluid collection noted.  Edges well approximated.  JUDY drain with small amount of serous drainage, removed without difficulty.   Pulmonary:      Effort: Pulmonary effort is normal. No respiratory distress.   Neurological:      General: No focal deficit present.      Mental Status: She is alert.   Psychiatric:         Mood and Affect: Mood normal.         Behavior: Behavior normal.         Thought Content: Thought content normal.         Assessment:       Problem List Items Addressed This Visit        Oncology    Papillary thyroid carcinoma - Primary     Drain removed. Path pending. Will check calcium levels today. Questions answered.  RTC in 2 weeks, sooner if needed.           Relevant Orders    CALCIUM, IONIZED    CALCIUM    PTH, Intact          Plan:       Problem List Items Addressed This Visit        Oncology    Papillary thyroid carcinoma - Primary     Drain removed. Path pending. Will check calcium levels today. Questions answered.  RTC in 2 weeks, sooner if needed.           Relevant Orders    CALCIUM, IONIZED    CALCIUM (Completed)    PTH, Intact

## 2022-07-25 NOTE — ASSESSMENT & PLAN NOTE
Drain removed. Path pending. Will check calcium levels today. Questions answered.  RTC in 2 weeks, sooner if needed.

## 2022-07-27 ENCOUNTER — TELEPHONE (OUTPATIENT)
Dept: ENDOCRINOLOGY | Facility: CLINIC | Age: 27
End: 2022-07-27
Payer: COMMERCIAL

## 2022-07-27 NOTE — TELEPHONE ENCOUNTER
Spoke with Ms. Felicitas Christine & scheduled an appt. at [facility] on 8/10/22 with Dr. Patel.

## 2022-07-29 ENCOUNTER — PATIENT MESSAGE (OUTPATIENT)
Dept: FAMILY MEDICINE | Facility: CLINIC | Age: 27
End: 2022-07-29
Payer: COMMERCIAL

## 2022-07-29 NOTE — TELEPHONE ENCOUNTER
I spoke with pts mother via phone, I advised her of the fax number to our office. All understanding no questions or concerns at this time.

## 2022-08-01 ENCOUNTER — PATIENT MESSAGE (OUTPATIENT)
Dept: OTOLARYNGOLOGY | Facility: CLINIC | Age: 27
End: 2022-08-01
Payer: COMMERCIAL

## 2022-08-01 ENCOUNTER — PATIENT MESSAGE (OUTPATIENT)
Dept: FAMILY MEDICINE | Facility: CLINIC | Age: 27
End: 2022-08-01
Payer: COMMERCIAL

## 2022-08-02 ENCOUNTER — PATIENT MESSAGE (OUTPATIENT)
Dept: OTOLARYNGOLOGY | Facility: CLINIC | Age: 27
End: 2022-08-02
Payer: COMMERCIAL

## 2022-08-02 ENCOUNTER — TELEPHONE (OUTPATIENT)
Dept: OTOLARYNGOLOGY | Facility: CLINIC | Age: 27
End: 2022-08-02
Payer: COMMERCIAL

## 2022-08-02 LAB
FINAL PATHOLOGIC DIAGNOSIS: NORMAL
FROZEN SECTION DIAGNOSIS: NORMAL
FROZEN SECTION FOOTNOTE: NORMAL
GROSS: NORMAL
Lab: NORMAL
MICROSCOPIC EXAM: NORMAL

## 2022-08-02 NOTE — TELEPHONE ENCOUNTER
We had a long discussion about her pathology and her symptoms. She is doing well. Her voice is normal, and her throat no longer hurts - that was clearly related to intubation and not vocal fold issues. She has no current symptoms of hypoparathyroidism. We discussed the path report. She will likely benefit from CR, but I defer to Dr. Patel and Dr. Avila on the dosing - some of this will be predicated perhaps on her postop Tg. She will followup as scheduled.

## 2022-08-03 ENCOUNTER — PATIENT MESSAGE (OUTPATIENT)
Dept: FAMILY MEDICINE | Facility: CLINIC | Age: 27
End: 2022-08-03
Payer: COMMERCIAL

## 2022-08-10 ENCOUNTER — OFFICE VISIT (OUTPATIENT)
Dept: ENDOCRINOLOGY | Facility: CLINIC | Age: 27
End: 2022-08-10
Payer: COMMERCIAL

## 2022-08-10 DIAGNOSIS — C73 PAPILLARY THYROID CARCINOMA: Primary | ICD-10-CM

## 2022-08-10 DIAGNOSIS — E89.0 POST-SURGICAL HYPOTHYROIDISM: ICD-10-CM

## 2022-08-10 DIAGNOSIS — E83.51 HYPOCALCEMIA: ICD-10-CM

## 2022-08-10 PROCEDURE — 99204 OFFICE O/P NEW MOD 45 MIN: CPT | Mod: 95,,, | Performed by: INTERNAL MEDICINE

## 2022-08-10 PROCEDURE — 1159F PR MEDICATION LIST DOCUMENTED IN MEDICAL RECORD: ICD-10-PCS | Mod: CPTII,95,, | Performed by: INTERNAL MEDICINE

## 2022-08-10 PROCEDURE — 1160F RVW MEDS BY RX/DR IN RCRD: CPT | Mod: CPTII,95,, | Performed by: INTERNAL MEDICINE

## 2022-08-10 PROCEDURE — 99204 PR OFFICE/OUTPT VISIT, NEW, LEVL IV, 45-59 MIN: ICD-10-PCS | Mod: 95,,, | Performed by: INTERNAL MEDICINE

## 2022-08-10 PROCEDURE — 1160F PR REVIEW ALL MEDS BY PRESCRIBER/CLIN PHARMACIST DOCUMENTED: ICD-10-PCS | Mod: CPTII,95,, | Performed by: INTERNAL MEDICINE

## 2022-08-10 PROCEDURE — 1159F MED LIST DOCD IN RCRD: CPT | Mod: CPTII,95,, | Performed by: INTERNAL MEDICINE

## 2022-08-10 NOTE — ASSESSMENT & PLAN NOTE
Reviewed general care    pathology report reviewed. Multifocal PTC, 5 cm primary, 2.5 mm secondary tumor. Margins <1mm.   17/30 LN +, largest 1.05 cm in size.   Extra-siobhan extension present but less than 1 mm extension.    ТАТЬЯНА at least intermediate risk, towards higher side risk for recurrence. 20-40% chance for recurrence.    Discussed recommendation for radioactive iodine ablation. Leaning towards 100-150 mCi, but will want to see how thyroglobulin is looking. Reviewed usual protocol there. Low iodine diet, thyrogen Monday/Tuesday, labs and iodine Wednesday, post-treatment scan 7-10 days after. Avoid pregnancy for 6-12 months after. Answered questions about side effects, risk of recurrence of thyroid cancer vs risks of secondary malignancies in the future, etc.    Discuss her case at tumor board next week. Get early TG this week. Likely repeat in September to have a more complete understanding with next TSH. Unless TG already negative. Though with number of lymph nodes impacted, would still want to give radioactive iodine but maybe  instead of higher doses if TG is at a reassuring level.    For now TSH goal is low.

## 2022-08-10 NOTE — ASSESSMENT & PLAN NOTE
On levothyroxine   continue 112 mcg/day for now   recheck blood test for that in early September.   adjust dose based on results   goal TSH on the low side

## 2022-08-10 NOTE — ASSESSMENT & PLAN NOTE
After surgery, fairly common   sometimes having symptoms   check labs this week   if Ca okay, can start decreasing supplementation doses   otherwise increase to BID calcitriol if Ca still low.

## 2022-08-10 NOTE — PROGRESS NOTES
Subjective:      Chief Complaint: Thyroid Cancer and Hypothyroidism      The patient location is: [place]  The chief complaint leading to consultation is: thyroid cancer    Visit type: audiovisual    Face to Face time with patient: 32 minutes  49 minutes of total time spent on the encounter, which includes face to face time and non-face to face time preparing to see the patient (eg, review of tests), Obtaining and/or reviewing separately obtained history, Documenting clinical information in the electronic or other health record, Independently interpreting results (not separately reported) and communicating results to the patient/family/caregiver, or Care coordination (not separately reported).     Each patient to whom he or she provides medical services by telemedicine is:  (1) informed of the relationship between the physician and patient and the respective role of any other health care provider with respect to management of the patient; and (2) notified that he or she may decline to receive medical services by telemedicine and may withdraw from such care at any time.    Notes:     HPI: Brennan Christine is a 27 y.o. female who is having an initial evaluation for thyroid cancer.    For her thyroid cancer:  Nodule found in June. Confirmed on US.  FNA 7/7/2022 + for PTC.  Treated with thyroidectomy on 7/21/2022.    Pathology:  Multifocal PTC.   5x2.8x1.7 cm primary, 2.5x2.5 mm   Margins less than 1 mm  No angioinvasion  No lymphatic invasion  No neural invasion  No ETE  17/30 LN positive. Largest LN 10.5 mm in size.   Extra-siobhan extension present, <1mm    Last neck US: before surgery    Last thyroglobulin:  No results found for: THYGLBTUM, THGABSCRN    With regards to her post-surgical hypothyroidism:    Current medication:  generic levothyroxine  Current dose: 112 mcg    Lab Results   Component Value Date    TSH 1.298 06/08/2022    THYROPEROXID <6.0 06/08/2022     Also   tums 2,000 mg TID  Calcitriol 0.25 mcg  daily    Current symptoms:  No   Yes  []    [x]  Trouble swallowing  [x]    []  Trouble breathing  []    [x]  Voice changes, occasional  [x]    []  Neck swelling    []    [x]  Fatigue  [x]    []  Constipation/diarrhea  [x]    []  Heat/Cold intolerance  []    [x]  Weight gain or weight loss. Lost weight after surgery, down to 189 lbs.  []    [x]  Palpitations or tremor  []    [x]  Irregular menstrual cycles. On IUD, doesn't often get any    Feels like hairball in the throat  Sneezing is hard    Some numbness in the neck. Some discomfort    Tingling in legs sometimes.         Reviewed past medical, family, social history and updated as appropriate.    Review of Systems  As above    Objective:   Prior vitals (if available):  BP Readings from Last 5 Encounters:   07/25/22 118/79   07/22/22 122/78   07/19/22 119/80   07/14/22 116/75   06/08/22 110/74     Physical Exam  Constitutional:       General: She is not in acute distress.  Pulmonary:      Effort: Pulmonary effort is normal.         Wt Readings from Last 5 Encounters:   07/25/22 1407 88.5 kg (195 lb)   07/21/22 0812 88.5 kg (195 lb)   07/19/22 0845 90.1 kg (198 lb 10.2 oz)   07/14/22 1315 86.7 kg (191 lb 2.2 oz)   06/08/22 1523 87.5 kg (192 lb 14.4 oz)     No results found for: HGBA1C  Lab Results   Component Value Date    CHOL 180 06/08/2022    HDL 37 (L) 06/08/2022    LDLCALC 120.8 06/08/2022    TRIG 111 06/08/2022    CHOLHDL 20.6 06/08/2022     Lab Results   Component Value Date     06/07/2022    K 4.0 06/07/2022     06/07/2022    CO2 22 (L) 06/07/2022    GLU 80 06/07/2022    BUN 14 06/07/2022    CREATININE 0.8 06/07/2022    CALCIUM 8.6 (L) 07/25/2022    PROT 7.8 06/07/2022    ALBUMIN 4.5 06/07/2022    BILITOT 0.7 06/07/2022    ALKPHOS 63 06/07/2022    AST 18 06/07/2022    ALT 22 06/07/2022    ANIONGAP 13 06/07/2022    ESTGFRAFRICA >60 06/07/2022    EGFRNONAA >60 06/07/2022    TSH 1.298 06/08/2022      No results found for:  MICALBCREAT    Assessment/Plan:     Papillary thyroid carcinoma  Reviewed general care    pathology report reviewed. Multifocal PTC, 5 cm primary, 2.5 mm secondary tumor. Margins <1mm.   17/30 LN +, largest 1.05 cm in size.   Extra-siobhan extension present but less than 1 mm extension.    ТАТЬЯНА at least intermediate risk, towards higher side risk for recurrence. 20-40% chance for recurrence.    Discussed recommendation for radioactive iodine ablation. Leaning towards 100-150 mCi, but will want to see how thyroglobulin is looking. Reviewed usual protocol there. Low iodine diet, thyrogen Monday/Tuesday, labs and iodine Wednesday, post-treatment scan 7-10 days after. Avoid pregnancy for 6-12 months after. Answered questions about side effects, risk of recurrence of thyroid cancer vs risks of secondary malignancies in the future, etc.    Discuss her case at tumor board next week. Get early TG this week. Likely repeat in September to have a more complete understanding with next TSH. Unless TG already negative. Though with number of lymph nodes impacted, would still want to give radioactive iodine but maybe  instead of higher doses if TG is at a reassuring level.    For now TSH goal is low.      Hypocalcemia  After surgery, fairly common   sometimes having symptoms   check labs this week   if Ca okay, can start decreasing supplementation doses   otherwise increase to BID calcitriol if Ca still low.      Post-surgical hypothyroidism  On levothyroxine   continue 112 mcg/day for now   recheck blood test for that in early September.   adjust dose based on results   goal TSH on the low side        Follow up in about 4 months (around 12/10/2022) for lab review, further monitoring.        Brian Patel MD  Endocrinology

## 2022-08-15 ENCOUNTER — LAB VISIT (OUTPATIENT)
Dept: LAB | Facility: HOSPITAL | Age: 27
End: 2022-08-15
Attending: INTERNAL MEDICINE
Payer: COMMERCIAL

## 2022-08-15 DIAGNOSIS — E83.51 HYPOCALCEMIA: ICD-10-CM

## 2022-08-15 DIAGNOSIS — C73 PAPILLARY THYROID CARCINOMA: ICD-10-CM

## 2022-08-15 PROCEDURE — 36415 COLL VENOUS BLD VENIPUNCTURE: CPT | Mod: PO | Performed by: INTERNAL MEDICINE

## 2022-08-15 PROCEDURE — 82306 VITAMIN D 25 HYDROXY: CPT | Performed by: INTERNAL MEDICINE

## 2022-08-15 PROCEDURE — 82330 ASSAY OF CALCIUM: CPT | Performed by: INTERNAL MEDICINE

## 2022-08-15 PROCEDURE — 84432 ASSAY OF THYROGLOBULIN: CPT | Performed by: INTERNAL MEDICINE

## 2022-08-15 PROCEDURE — 83970 ASSAY OF PARATHORMONE: CPT | Performed by: INTERNAL MEDICINE

## 2022-08-15 PROCEDURE — 80053 COMPREHEN METABOLIC PANEL: CPT | Performed by: INTERNAL MEDICINE

## 2022-08-16 ENCOUNTER — TELEPHONE (OUTPATIENT)
Dept: ENDOCRINOLOGY | Facility: CLINIC | Age: 27
End: 2022-08-16
Payer: COMMERCIAL

## 2022-08-16 LAB
25(OH)D3+25(OH)D2 SERPL-MCNC: 19 NG/ML (ref 30–96)
ALBUMIN SERPL BCP-MCNC: 4.4 G/DL (ref 3.5–5.2)
ALP SERPL-CCNC: 62 U/L (ref 55–135)
ALT SERPL W/O P-5'-P-CCNC: 16 U/L (ref 10–44)
ANION GAP SERPL CALC-SCNC: 15 MMOL/L (ref 8–16)
AST SERPL-CCNC: 14 U/L (ref 10–40)
BILIRUB SERPL-MCNC: 0.8 MG/DL (ref 0.1–1)
BUN SERPL-MCNC: 18 MG/DL (ref 6–20)
CA-I BLDV-SCNC: 1.3 MMOL/L (ref 1.06–1.42)
CALCIUM SERPL-MCNC: 10.3 MG/DL (ref 8.7–10.5)
CHLORIDE SERPL-SCNC: 106 MMOL/L (ref 95–110)
CO2 SERPL-SCNC: 21 MMOL/L (ref 23–29)
CREAT SERPL-MCNC: 0.8 MG/DL (ref 0.5–1.4)
EST. GFR  (NO RACE VARIABLE): >60 ML/MIN/1.73 M^2
GLUCOSE SERPL-MCNC: 90 MG/DL (ref 70–110)
POTASSIUM SERPL-SCNC: 3.8 MMOL/L (ref 3.5–5.1)
PROT SERPL-MCNC: 7.8 G/DL (ref 6–8.4)
PTH-INTACT SERPL-MCNC: 9.4 PG/ML (ref 9–77)
SODIUM SERPL-SCNC: 142 MMOL/L (ref 136–145)

## 2022-08-16 NOTE — TELEPHONE ENCOUNTER
Wants an interpretation of her recent lab results & wants to know if she should continue taking Calcitrol & Calcium?

## 2022-08-16 NOTE — TELEPHONE ENCOUNTER
----- Message from Tanya Ramirez MA sent at 8/16/2022  3:15 PM CDT -----  Contact: [phone]    ----- Message -----  From: Kate Aaron  Sent: 8/16/2022  12:42 PM CDT  To: Camila Caicedo Staff    Type: Needs Medical Advice  Who Called: Pts Mother     Best Call Back Number: [phone]    Additional Information: Pts Mother is requesting a call from Rosa. She did not want to leave any other information. Pls call back and advise

## 2022-08-17 ENCOUNTER — PATIENT MESSAGE (OUTPATIENT)
Dept: ENDOCRINOLOGY | Facility: CLINIC | Age: 27
End: 2022-08-17
Payer: COMMERCIAL

## 2022-08-17 LAB
THRYOGLOBULIN INTERPRETATION: NORMAL
THYROGLOB AB SERPL-ACNC: <1.8 IU/ML
THYROGLOB SERPL-MCNC: <0.1 NG/ML

## 2022-08-19 ENCOUNTER — OFFICE VISIT (OUTPATIENT)
Dept: OTOLARYNGOLOGY | Facility: CLINIC | Age: 27
End: 2022-08-19
Payer: COMMERCIAL

## 2022-08-19 VITALS
BODY MASS INDEX: 30.63 KG/M2 | DIASTOLIC BLOOD PRESSURE: 75 MMHG | HEART RATE: 76 BPM | WEIGHT: 195.56 LBS | SYSTOLIC BLOOD PRESSURE: 102 MMHG

## 2022-08-19 DIAGNOSIS — C73 PAPILLARY THYROID CARCINOMA: ICD-10-CM

## 2022-08-19 DIAGNOSIS — E89.0 POST-SURGICAL HYPOTHYROIDISM: Primary | ICD-10-CM

## 2022-08-19 PROCEDURE — 3008F BODY MASS INDEX DOCD: CPT | Mod: CPTII,S$GLB,, | Performed by: OTOLARYNGOLOGY

## 2022-08-19 PROCEDURE — 99499 UNLISTED E&M SERVICE: CPT | Mod: S$GLB,,, | Performed by: OTOLARYNGOLOGY

## 2022-08-19 PROCEDURE — 1160F PR REVIEW ALL MEDS BY PRESCRIBER/CLIN PHARMACIST DOCUMENTED: ICD-10-PCS | Mod: CPTII,S$GLB,, | Performed by: OTOLARYNGOLOGY

## 2022-08-19 PROCEDURE — 99499 RISK ADDL DX/OHS AUDIT: ICD-10-PCS | Mod: S$GLB,,, | Performed by: OTOLARYNGOLOGY

## 2022-08-19 PROCEDURE — 99999 PR PBB SHADOW E&M-EST. PATIENT-LVL III: ICD-10-PCS | Mod: PBBFAC,,, | Performed by: OTOLARYNGOLOGY

## 2022-08-19 PROCEDURE — 3078F PR MOST RECENT DIASTOLIC BLOOD PRESSURE < 80 MM HG: ICD-10-PCS | Mod: CPTII,S$GLB,, | Performed by: OTOLARYNGOLOGY

## 2022-08-19 PROCEDURE — 99999 PR PBB SHADOW E&M-EST. PATIENT-LVL III: CPT | Mod: PBBFAC,,, | Performed by: OTOLARYNGOLOGY

## 2022-08-19 PROCEDURE — 3078F DIAST BP <80 MM HG: CPT | Mod: CPTII,S$GLB,, | Performed by: OTOLARYNGOLOGY

## 2022-08-19 PROCEDURE — 1159F PR MEDICATION LIST DOCUMENTED IN MEDICAL RECORD: ICD-10-PCS | Mod: CPTII,S$GLB,, | Performed by: OTOLARYNGOLOGY

## 2022-08-19 PROCEDURE — 1160F RVW MEDS BY RX/DR IN RCRD: CPT | Mod: CPTII,S$GLB,, | Performed by: OTOLARYNGOLOGY

## 2022-08-19 PROCEDURE — 3074F SYST BP LT 130 MM HG: CPT | Mod: CPTII,S$GLB,, | Performed by: OTOLARYNGOLOGY

## 2022-08-19 PROCEDURE — 3074F PR MOST RECENT SYSTOLIC BLOOD PRESSURE < 130 MM HG: ICD-10-PCS | Mod: CPTII,S$GLB,, | Performed by: OTOLARYNGOLOGY

## 2022-08-19 PROCEDURE — 99024 PR POST-OP FOLLOW-UP VISIT: ICD-10-PCS | Mod: S$GLB,,, | Performed by: OTOLARYNGOLOGY

## 2022-08-19 PROCEDURE — 1159F MED LIST DOCD IN RCRD: CPT | Mod: CPTII,S$GLB,, | Performed by: OTOLARYNGOLOGY

## 2022-08-19 PROCEDURE — 99024 POSTOP FOLLOW-UP VISIT: CPT | Mod: S$GLB,,, | Performed by: OTOLARYNGOLOGY

## 2022-08-19 PROCEDURE — 3008F PR BODY MASS INDEX (BMI) DOCUMENTED: ICD-10-PCS | Mod: CPTII,S$GLB,, | Performed by: OTOLARYNGOLOGY

## 2022-08-19 RX ORDER — CALCITRIOL 0.25 UG/1
0.25 CAPSULE ORAL DAILY
Qty: 30 CAPSULE | Refills: 6 | Status: SHIPPED | OUTPATIENT
Start: 2022-08-19 | End: 2022-09-20 | Stop reason: SDUPTHER

## 2022-08-19 NOTE — PROGRESS NOTES
[facility]    Subjective:       Patient ID: Brennan Christine is a 27 y.o. female.    Chief Complaint: post op    HPI  Oncology History:  Oncology History   Papillary thyroid carcinoma   7/7/2022 Biopsy    FNA = malignant, papillary thyroid carcinoma     7/18/2022 Initial Diagnosis    Papillary thyroid carcinoma     7/19/2022 Cancer Staged    Staging form: Thyroid - Differentiated, AJCC 8th Edition  - Clinical stage from 7/19/2022: Stage I (cT2, cN1a, cM0, Age at diagnosis: < 55 years)     7/21/2022 Surgery    1) Total thyroidectomy with bilateral paratracheal and superior mediastinal lymphadenectomies     8/2/2022 Cancer Staged    Staging form: Thyroid - Differentiated, AJCC 8th Edition  - Pathologic stage from 8/2/2022: Stage I (pT3a, pN1a, cM0, Age at diagnosis: < 55 years)       Brennan Christine is a 27 y.o. female who presents for followup after a total thyroidectomy and bilateral central compartment neck dissection for papillary thyroid carcinoma. She is doing well and notes that her voice and swallowing are normal. She is breathing normally. She still gets some tingling in her feet and fingers, but her PTH is normalizing and Dr. Patel has her on a calcium taper. She is more pleased with her incision now that the glue has come off. She has some left neck tingling and spasm at times.     Past Medical History:   Diagnosis Date    Chronic back pain     Complication of anesthesia Reglan allergy    Papillary thyroid carcinoma 07/18/2022    Venous malformation     Left Arm       Past Surgical History:   Procedure Laterality Date    ADENOIDECTOMY      SCLEROTHERAPY WITH ULTRASOUND GUIDANCE      THYROIDECTOMY Bilateral 7/21/2022    Procedure: THYROIDECTOMY;  Surgeon: Iglesia Vale MD;  Location: [facility] OR [address] [place] [address];  Service: ENT;  Laterality: Bilateral;  NIM tube. Taking time in (or from) [facility], possible lateral neck dissection    TONSILLECTOMY         Current  Outpatient Medications:     calcium carbonate (TUMS) 200 mg calcium (500 mg) chewable tablet, Chew and swallow 4 tablets (2,000 mg total) by mouth 3 (three) times daily., Disp: 360 tablet, Rfl: 11    hydrocodone-acetaminophen (HYCET) solution 7.5-325 mg/15mL, Take 15 mLs by mouth every 8 (eight) hours as needed for Pain., Disp: 118 mL, Rfl: 0    levonorgestrel (MIRENA IU), Mirena Take No date recorded No form recorded No frequency recorded No route recorded No set duration recorded No set duration amount recorded suspended No dosage strength recorded No dosage strength units of measure recorded, Disp: , Rfl:     levothyroxine (SYNTHROID) 112 MCG tablet, Take 1 tablet (112 mcg total) by mouth before breakfast., Disp: 30 tablet, Rfl: 11    calcitRIOL (ROCALTROL) 0.25 MCG Cap, Take 1 capsule (0.25 mcg total) by mouth once daily., Disp: 30 capsule, Rfl: 6    diazePAM (VALIUM) 10 MG Tab, Take 1 tablet (10 mg total) by mouth once. for 1 dose, Disp: 1 tablet, Rfl: 0    Review of patient's allergies indicates:   Allergen Reactions    Metoclopramide Nausea And Vomiting, Other (See Comments) and Shortness Of Breath    Niacin Hives    Ceclor [cefaclor]     Benzphetamine Nausea And Vomiting and Other (See Comments)    Sulfamethoxazole-trimethoprim Itching, Nausea And Vomiting, Rash and Other (See Comments)     BACTRIM       Social History     Socioeconomic History    Marital status: Single   Tobacco Use    Smoking status: Current Every Day Smoker     Packs/day: 0.50     Types: Vaping with nicotine     Last attempt to quit: 2020     Years since quittin.1    Smokeless tobacco: Never Used    Tobacco comment: former cigarettes   Substance and Sexual Activity    Alcohol use: No    Drug use: No    Sexual activity: Not Currently     Partners: Male     Birth control/protection: I.U.D.     Family History   Problem Relation Age of Onset    Hypertension Mother     Diabetes Mellitus Father     Hypertension  Maternal Grandmother     Hypertension Maternal Grandfather     Coronary artery disease Maternal Grandfather     Kidney disease Maternal Grandfather     Cancer Cousin         neuroendocrine cancer per family     Review of Systems   Constitutional: Positive for appetite change, chills and unexpected weight change (gaining). Negative for activity change, fatigue and fever.   HENT: Positive for ear discharge, mouth sores, sinus pressure, sore throat, trouble swallowing and voice change. Negative for congestion, dental problem, drooling, ear pain, facial swelling, hearing loss, nosebleeds, postnasal drip, rhinorrhea, sneezing and tinnitus.    Eyes: Positive for photophobia and itching. Negative for pain and visual disturbance.   Respiratory: Positive for shortness of breath and wheezing. Negative for cough and choking.    Cardiovascular: Positive for chest pain. Negative for palpitations and leg swelling.   Gastrointestinal: Positive for abdominal pain, constipation and diarrhea. Negative for nausea and vomiting.   Endocrine: Positive for cold intolerance and heat intolerance.   Genitourinary: Negative.  Negative for difficulty urinating, dysuria and hematuria.   Musculoskeletal: Positive for back pain and neck pain. Negative for arthralgias, gait problem, myalgias and neck stiffness.   Skin: Positive for rash. Negative for wound.   Allergic/Immunologic: Negative.  Negative for environmental allergies and immunocompromised state.   Neurological: Positive for dizziness, light-headedness and headaches. Negative for facial asymmetry, speech difficulty, weakness and numbness.   Hematological: Negative.  Negative for adenopathy. Does not bruise/bleed easily.   Psychiatric/Behavioral: Positive for decreased concentration. Negative for dysphoric mood. The patient is nervous/anxious.        Answers for HPI/ROS submitted by the patient on 8/16/2022  Fatigue (Tiredness)?: Yes  Irregular heartbeat?: Yes  Muscle aches / pain?:  Yes  Feeling depressed?: Yes    Objective:     Physical Exam  Vitals reviewed.   Constitutional:       General: She is not in acute distress.     Appearance: She is well-developed.   HENT:      Head: Normocephalic and atraumatic.      Jaw: No trismus.      Salivary Glands: Right salivary gland is not diffusely enlarged or tender. Left salivary gland is not diffusely enlarged or tender.      Comments: Salivary glands - there are no lesions or asymmetric findings in the submandibular or parotid glands     Right Ear: Hearing, tympanic membrane, ear canal and external ear normal.      Left Ear: Hearing, tympanic membrane, ear canal and external ear normal.      Nose: No nasal deformity or rhinorrhea.      Mouth/Throat:      Mouth: No oral lesions.      Tongue: No lesions.      Palate: No mass and lesions.      Pharynx: Uvula midline.      Comments: NP: No lesions of posterior wall  OP: No lesions of posterior wall or BOT. BOT is soft to palpation  Larynx: No lesions of glottic or supraglottic larynx. Normal vocal fold mobility    HP: No lesions of pyriform sinuses or postcricoid region  Mirror examination was performed.    Eyes:      General: Lids are normal.      Extraocular Movements: Extraocular movements intact.      Conjunctiva/sclera:      Right eye: Right conjunctiva is not injected. No chemosis.     Left eye: Left conjunctiva is not injected. No chemosis.  Neck:      Thyroid: No thyroid mass or thyromegaly.      Vascular: No JVD.      Trachea: Phonation normal. No tracheal deviation.     Pulmonary:      Effort: Pulmonary effort is normal. No accessory muscle usage or respiratory distress.      Breath sounds: No stridor.   Chest:   Breasts:      Right: No supraclavicular adenopathy.      Left: No supraclavicular adenopathy.       Musculoskeletal:         General: No tenderness.      Cervical back: Full passive range of motion without pain.   Lymphadenopathy:      Cervical: No cervical adenopathy.      Right  cervical: No deep or posterior cervical adenopathy.     Left cervical: No deep or posterior cervical adenopathy.      Upper Body:      Right upper body: No supraclavicular adenopathy.      Left upper body: No supraclavicular adenopathy.   Skin:     Findings: No erythema, lesion or rash.      Nails: There is no clubbing.   Neurological:      Mental Status: She is alert and oriented to person, place, and time.      Cranial Nerves: No cranial nerve deficit or facial asymmetry.      Motor: No weakness.      Gait: Gait normal.   Psychiatric:         Speech: Speech normal.         Behavior: Behavior is cooperative.          Lab Results   Component Value Date    PTH 9.4 08/15/2022    CALCIUM 10.3 08/15/2022    CAION 1.30 08/15/2022     Vitamin D 19    Tg < 0.1    Procedure: Thyroidectomy Tumor focality: multifocal Tumor site(s): Right and left lobes Tumor size(s): 5.0 x 2.8 x 1.7 cm; 2.5 x 2.5 mm Histologic type: Papillary, classic variant Margins: -Distance of invasive carcinoma from closest margin: less than 1 mm (2.5 mm lesion); less than 1 mm (10.5mm lesion) Angioinvasion: Not identified Lymphatic invasion: Not identified Perineural invasion: Not identified Mitotic rate: Less than 1 per 2 mm2 Extrathyroidal extension: Not identified Regional lymph nodes: -Number of lymph nodes involved: 17 Magda levels involved: VI Size of largest metastatic deposit: 10.5 mm Extranodal extension: Present, less than 1mm in extent Number of lymph nodes examined: 30 Magda levels examined: Level VI Pathologic stage classification: mpT3a N1a    Assessment & Plan:       Problem List Items Addressed This Visit     Papillary thyroid carcinoma     She is recovering nicely after her total thyroidectomy and central neck dissection, with an undetectable Tg. Her PTH is normalizing as expected as well. She will need CR, and we spoke about her at the endocrine tumor board this week - the Tg does create some opportunity for more discussion about the  dose of CR. I defer to Leigha Patel and Austin on that point.     She does not need to return to see me for routine followup, as she is established with Dr. Patel and endocrinology. We discussed what will come, and things to be on the lookout for. They expressed understanding.            Relevant Medications    calcitRIOL (ROCALTROL) 0.25 MCG Cap    Post-surgical hypothyroidism - Primary

## 2022-08-20 NOTE — ASSESSMENT & PLAN NOTE
She is recovering nicely after her total thyroidectomy and central neck dissection, with an undetectable Tg. Her PTH is normalizing as expected as well. She will need CR, and we spoke about her at the endocrine tumor board this week - the Tg does create some opportunity for more discussion about the dose of CR. I defer to Leigha Patel and Austin on that point.     She does not need to return to see me for routine followup, as she is established with Dr. Patel and endocrinology. We discussed what will come, and things to be on the lookout for. They expressed understanding.

## 2022-08-25 ENCOUNTER — TELEPHONE (OUTPATIENT)
Dept: ENDOCRINOLOGY | Facility: CLINIC | Age: 27
End: 2022-08-25
Payer: COMMERCIAL

## 2022-08-25 ENCOUNTER — PATIENT MESSAGE (OUTPATIENT)
Dept: ENDOCRINOLOGY | Facility: CLINIC | Age: 27
End: 2022-08-25
Payer: COMMERCIAL

## 2022-08-25 DIAGNOSIS — E83.51 HYPOCALCEMIA: ICD-10-CM

## 2022-08-25 DIAGNOSIS — E89.0 POST-SURGICAL HYPOTHYROIDISM: Primary | ICD-10-CM

## 2022-08-25 NOTE — TELEPHONE ENCOUNTER
Spoke to the pt and her mom Felicitas and informed them on what the provider stated about taking OTC Vitamin D and that he ordered labs for the pt. The pt stated that she will go and get the lab work done.

## 2022-08-25 NOTE — TELEPHONE ENCOUNTER
----- Message from Sascha Jensen MD sent at 8/25/2022  3:10 PM CDT -----  Contact: Mother Felicitas  I am not sure what is causing the patients symptoms but I have ordered some lab tests (non fasting) which she can get done to provided more insight. In the meantime advise her to ensure she is taking a daily OTC vitamin D supplement (at least 2000 units daily) in addition to her daily calcium supplement.  Thanks    Sascha Jensen MD  ----- Message -----  From: Alpa Garcia MA  Sent: 8/25/2022   3:00 PM CDT  To: Sascha Jensen MD    Helsudha Jensen    Since Dr. Patel is out of office and patients mother stated you were similar about this patients case can you advise on this situation.    Thanks,  Alpa RODRIGUEZ   ----- Message -----  From: Rissa Reyes  Sent: 8/25/2022   2:47 PM CDT  To: Jorge SANCHEZ Staff    Patient is about a month out from thyroid cancer surgery, and she is experiencing headaches and nausea.  The headache is about the third on this week but the nausea started today.  Please call Felicitas back to advise at [phone] and thanks

## 2022-08-25 NOTE — TELEPHONE ENCOUNTER
----- Message from Rissa Reyes sent at 8/25/2022  2:44 PM CDT -----  Contact: Mother Felicitas  Patient is about a month out from thyroid cancer surgery, and she is experiencing headaches and nausea.  The headache is about the third on this week but the nausea started today.  Please call Felicitas back to advise at [phone] and thanks

## 2022-08-25 NOTE — TELEPHONE ENCOUNTER
----- Message from Sascha Jensen MD sent at 8/25/2022  3:10 PM CDT -----  Contact: Mother Felicitas  I am not sure what is causing the patients symptoms but I have ordered some lab tests (non fasting) which she can get done to provided more insight. In the meantime advise her to ensure she is taking a daily OTC vitamin D supplement (at least 2000 units daily) in addition to her daily calcium supplement.  Thanks    Sascha Jensen MD  ----- Message -----  From: Alpa Garcia MA  Sent: 8/25/2022   3:00 PM CDT  To: Sascha Jensen MD    Helsudha Jensen    Since Dr. Patel is out of office and patients mother stated you were similar about this patients case can you advise on this situation.    Thanks,  Alpa RODRIGUEZ   ----- Message -----  From: Rissa Reyes  Sent: 8/25/2022   2:47 PM CDT  To: Jorge SANCHEZ Staff    Patient is about a month out from thyroid cancer surgery, and she is experiencing headaches and nausea.  The headache is about the third on this week but the nausea started today.  Please call Felicitas back to advise at [phone] and thanks

## 2022-08-26 ENCOUNTER — LAB VISIT (OUTPATIENT)
Dept: LAB | Facility: HOSPITAL | Age: 27
End: 2022-08-26
Attending: INTERNAL MEDICINE
Payer: COMMERCIAL

## 2022-08-26 DIAGNOSIS — E83.51 HYPOCALCEMIA: ICD-10-CM

## 2022-08-26 DIAGNOSIS — E89.0 POST-SURGICAL HYPOTHYROIDISM: ICD-10-CM

## 2022-08-26 LAB
ALBUMIN SERPL BCP-MCNC: 4.2 G/DL (ref 3.5–5.2)
ALP SERPL-CCNC: 59 U/L (ref 55–135)
ALT SERPL W/O P-5'-P-CCNC: 13 U/L (ref 10–44)
ANION GAP SERPL CALC-SCNC: 11 MMOL/L (ref 8–16)
AST SERPL-CCNC: 11 U/L (ref 10–40)
BASOPHILS # BLD AUTO: 0.02 K/UL (ref 0–0.2)
BASOPHILS NFR BLD: 0.2 % (ref 0–1.9)
BILIRUB SERPL-MCNC: 0.7 MG/DL (ref 0.1–1)
BUN SERPL-MCNC: 12 MG/DL (ref 6–20)
CA-I BLDV-SCNC: 1.32 MMOL/L (ref 1.06–1.42)
CALCIUM SERPL-MCNC: 10.2 MG/DL (ref 8.7–10.5)
CHLORIDE SERPL-SCNC: 104 MMOL/L (ref 95–110)
CO2 SERPL-SCNC: 23 MMOL/L (ref 23–29)
CREAT SERPL-MCNC: 0.8 MG/DL (ref 0.5–1.4)
DIFFERENTIAL METHOD: NORMAL
EOSINOPHIL # BLD AUTO: 0.1 K/UL (ref 0–0.5)
EOSINOPHIL NFR BLD: 0.7 % (ref 0–8)
ERYTHROCYTE [DISTWIDTH] IN BLOOD BY AUTOMATED COUNT: 12.4 % (ref 11.5–14.5)
EST. GFR  (NO RACE VARIABLE): >60 ML/MIN/1.73 M^2
GLUCOSE SERPL-MCNC: 81 MG/DL (ref 70–110)
HCT VFR BLD AUTO: 37.5 % (ref 37–48.5)
HGB BLD-MCNC: 13.1 G/DL (ref 12–16)
IMM GRANULOCYTES # BLD AUTO: 0.02 K/UL (ref 0–0.04)
IMM GRANULOCYTES NFR BLD AUTO: 0.2 % (ref 0–0.5)
LYMPHOCYTES # BLD AUTO: 2.6 K/UL (ref 1–4.8)
LYMPHOCYTES NFR BLD: 32 % (ref 18–48)
MAGNESIUM SERPL-MCNC: 1.8 MG/DL (ref 1.6–2.6)
MCH RBC QN AUTO: 30.5 PG (ref 27–31)
MCHC RBC AUTO-ENTMCNC: 34.9 G/DL (ref 32–36)
MCV RBC AUTO: 87 FL (ref 82–98)
MONOCYTES # BLD AUTO: 0.4 K/UL (ref 0.3–1)
MONOCYTES NFR BLD: 4.8 % (ref 4–15)
NEUTROPHILS # BLD AUTO: 5.1 K/UL (ref 1.8–7.7)
NEUTROPHILS NFR BLD: 62.1 % (ref 38–73)
NRBC BLD-RTO: 0 /100 WBC
PHOSPHATE SERPL-MCNC: 3.2 MG/DL (ref 2.7–4.5)
PLATELET # BLD AUTO: 229 K/UL (ref 150–450)
PMV BLD AUTO: 10.6 FL (ref 9.2–12.9)
POTASSIUM SERPL-SCNC: 4.2 MMOL/L (ref 3.5–5.1)
PROT SERPL-MCNC: 7.3 G/DL (ref 6–8.4)
PTH-INTACT SERPL-MCNC: 8.4 PG/ML (ref 9–77)
RBC # BLD AUTO: 4.3 M/UL (ref 4–5.4)
SODIUM SERPL-SCNC: 138 MMOL/L (ref 136–145)
T4 FREE SERPL-MCNC: 1.33 NG/DL (ref 0.71–1.51)
TSH SERPL DL<=0.005 MIU/L-ACNC: 1 UIU/ML (ref 0.4–4)
WBC # BLD AUTO: 8.16 K/UL (ref 3.9–12.7)

## 2022-08-26 PROCEDURE — 84443 ASSAY THYROID STIM HORMONE: CPT | Performed by: INTERNAL MEDICINE

## 2022-08-26 PROCEDURE — 84100 ASSAY OF PHOSPHORUS: CPT | Performed by: INTERNAL MEDICINE

## 2022-08-26 PROCEDURE — 80053 COMPREHEN METABOLIC PANEL: CPT | Performed by: INTERNAL MEDICINE

## 2022-08-26 PROCEDURE — 84439 ASSAY OF FREE THYROXINE: CPT | Performed by: INTERNAL MEDICINE

## 2022-08-26 PROCEDURE — 36415 COLL VENOUS BLD VENIPUNCTURE: CPT | Mod: PO | Performed by: INTERNAL MEDICINE

## 2022-08-26 PROCEDURE — 85025 COMPLETE CBC W/AUTO DIFF WBC: CPT | Performed by: INTERNAL MEDICINE

## 2022-08-26 PROCEDURE — 82330 ASSAY OF CALCIUM: CPT | Performed by: INTERNAL MEDICINE

## 2022-08-26 PROCEDURE — 83735 ASSAY OF MAGNESIUM: CPT | Performed by: INTERNAL MEDICINE

## 2022-08-26 PROCEDURE — 83970 ASSAY OF PARATHORMONE: CPT | Performed by: INTERNAL MEDICINE

## 2022-09-07 ENCOUNTER — PATIENT MESSAGE (OUTPATIENT)
Dept: OTOLARYNGOLOGY | Facility: CLINIC | Age: 27
End: 2022-09-07
Payer: MEDICARE

## 2022-09-09 ENCOUNTER — PATIENT MESSAGE (OUTPATIENT)
Dept: ENDOCRINOLOGY | Facility: CLINIC | Age: 27
End: 2022-09-09
Payer: MEDICARE

## 2022-09-09 DIAGNOSIS — C73 PAPILLARY THYROID CARCINOMA: Primary | ICD-10-CM

## 2022-09-09 DIAGNOSIS — E83.51 HYPOCALCEMIA: ICD-10-CM

## 2022-09-09 NOTE — TELEPHONE ENCOUNTER
Labs reviewed    Last labs:    Lab Results   Component Value Date    CALCIUM 10.2 08/26/2022    ALBUMIN 4.2 08/26/2022    CREATININE 0.8 08/26/2022    [id number] 19 (L) 08/15/2022    PTH 8.4 (L) 08/26/2022       Calcium high    Decrease calcium supplements.  Target is more like 8-9 range. Doesn't need to be on the high side.    Calcitriol once a day  2,000 mg BID, should be on 1,000 mg BID by now.    Drop to 500 mg BID calcium/tums.  Continue calcitriol once a day    Recheck CMP in 1 month    Needs radioactive iodine.    mCi.  Consider 50 due to negative thyroglobulin and young age  100 mCi due to high risk for recurrence.    TSH and thyroglobulin in for after the thyrogen doses.  WBS 1 week after iodine dose.  NM referral in.  Sending message.

## 2022-09-14 ENCOUNTER — PATIENT MESSAGE (OUTPATIENT)
Dept: ENDOCRINOLOGY | Facility: CLINIC | Age: 27
End: 2022-09-14
Payer: MEDICARE

## 2022-09-16 NOTE — TELEPHONE ENCOUNTER
Called pt mother.  Discussed with normal labs, 2 months out from surgery, don't have an endocrine related diagnosis for disability. Doesn't need to stay home until iodine treatment. Normally just a few days out after radioactive iodine, may even be able to go back sooner depending on how her job is set up.

## 2022-09-20 ENCOUNTER — PATIENT MESSAGE (OUTPATIENT)
Dept: FAMILY MEDICINE | Facility: CLINIC | Age: 27
End: 2022-09-20
Payer: MEDICARE

## 2022-09-20 ENCOUNTER — PATIENT MESSAGE (OUTPATIENT)
Dept: ENDOCRINOLOGY | Facility: CLINIC | Age: 27
End: 2022-09-20
Payer: MEDICARE

## 2022-09-20 DIAGNOSIS — E83.51 HYPOCALCEMIA: ICD-10-CM

## 2022-09-20 DIAGNOSIS — C73 PAPILLARY THYROID CARCINOMA: ICD-10-CM

## 2022-09-20 DIAGNOSIS — E89.0 POST-SURGICAL HYPOTHYROIDISM: Primary | ICD-10-CM

## 2022-09-20 RX ORDER — CALCIUM CARBONATE 200(500)MG
1 TABLET,CHEWABLE ORAL 2 TIMES DAILY
Qty: 60 TABLET | Refills: 2 | Status: SHIPPED | OUTPATIENT
Start: 2022-09-20 | End: 2022-11-18 | Stop reason: SDUPTHER

## 2022-09-20 RX ORDER — CALCITRIOL 0.25 UG/1
0.25 CAPSULE ORAL DAILY
Qty: 30 CAPSULE | Refills: 2 | Status: SHIPPED | OUTPATIENT
Start: 2022-09-20 | End: 2023-01-17

## 2022-09-20 RX ORDER — LEVOTHYROXINE SODIUM 112 UG/1
112 TABLET ORAL
Qty: 30 TABLET | Refills: 11 | Status: SHIPPED | OUTPATIENT
Start: 2022-09-20 | End: 2022-11-18 | Stop reason: SDUPTHER

## 2022-09-20 NOTE — TELEPHONE ENCOUNTER
Outpatient Medication Detail     Disp Refills Start End VICTOR MANUEL   calcitRIOL (ROCALTROL) 0.25 MCG Cap 30 capsule 6 8/19/2022  No   Sig - Route: Take 1 capsule (0.25 mcg total) by mouth once daily. - Oral   Sent to pharmacy as: calcitRIOL (ROCALTROL) 0.25 MCG Cap   Class: Normal   Order: [id number]   Date/Time Signed: 8/19/2022 09:21       E-Prescribing Status: Receipt confirmed by pharmacy (8/19/2022  9:23 AM CDT)     Pharmacy    [id number] - [place] - [address] AT [facility] [address]        Should already have.    Refills sent in

## 2022-09-26 ENCOUNTER — PATIENT MESSAGE (OUTPATIENT)
Dept: FAMILY MEDICINE | Facility: CLINIC | Age: 27
End: 2022-09-26
Payer: MEDICARE

## 2022-09-27 ENCOUNTER — PATIENT MESSAGE (OUTPATIENT)
Dept: FAMILY MEDICINE | Facility: CLINIC | Age: 27
End: 2022-09-27
Payer: MEDICARE

## 2022-10-07 ENCOUNTER — PATIENT MESSAGE (OUTPATIENT)
Dept: ENDOCRINOLOGY | Facility: CLINIC | Age: 27
End: 2022-10-07
Payer: MEDICARE

## 2022-10-07 DIAGNOSIS — E83.51 HYPOCALCEMIA: ICD-10-CM

## 2022-10-07 DIAGNOSIS — E89.0 POST-SURGICAL HYPOTHYROIDISM: Primary | ICD-10-CM

## 2022-10-17 ENCOUNTER — OFFICE VISIT (OUTPATIENT)
Dept: FAMILY MEDICINE | Facility: CLINIC | Age: 27
End: 2022-10-17
Payer: COMMERCIAL

## 2022-10-17 VITALS
OXYGEN SATURATION: 96 % | WEIGHT: 194.44 LBS | HEART RATE: 85 BPM | HEIGHT: 67 IN | SYSTOLIC BLOOD PRESSURE: 110 MMHG | BODY MASS INDEX: 30.52 KG/M2 | DIASTOLIC BLOOD PRESSURE: 70 MMHG | RESPIRATION RATE: 18 BRPM

## 2022-10-17 DIAGNOSIS — R10.9 ABDOMINAL PAIN, UNSPECIFIED ABDOMINAL LOCATION: ICD-10-CM

## 2022-10-17 DIAGNOSIS — R00.2 PALPITATIONS: Primary | ICD-10-CM

## 2022-10-17 DIAGNOSIS — R51.9 NEW ONSET OF HEADACHES: ICD-10-CM

## 2022-10-17 PROCEDURE — 1160F RVW MEDS BY RX/DR IN RCRD: CPT | Mod: CPTII,S$GLB,, | Performed by: STUDENT IN AN ORGANIZED HEALTH CARE EDUCATION/TRAINING PROGRAM

## 2022-10-17 PROCEDURE — 99214 OFFICE O/P EST MOD 30 MIN: CPT | Mod: S$GLB,,, | Performed by: STUDENT IN AN ORGANIZED HEALTH CARE EDUCATION/TRAINING PROGRAM

## 2022-10-17 PROCEDURE — 1160F PR REVIEW ALL MEDS BY PRESCRIBER/CLIN PHARMACIST DOCUMENTED: ICD-10-PCS | Mod: CPTII,S$GLB,, | Performed by: STUDENT IN AN ORGANIZED HEALTH CARE EDUCATION/TRAINING PROGRAM

## 2022-10-17 PROCEDURE — 99214 PR OFFICE/OUTPT VISIT, EST, LEVL IV, 30-39 MIN: ICD-10-PCS | Mod: S$GLB,,, | Performed by: STUDENT IN AN ORGANIZED HEALTH CARE EDUCATION/TRAINING PROGRAM

## 2022-10-17 PROCEDURE — 99999 PR PBB SHADOW E&M-EST. PATIENT-LVL IV: CPT | Mod: PBBFAC,,, | Performed by: STUDENT IN AN ORGANIZED HEALTH CARE EDUCATION/TRAINING PROGRAM

## 2022-10-17 PROCEDURE — 3074F SYST BP LT 130 MM HG: CPT | Mod: CPTII,S$GLB,, | Performed by: STUDENT IN AN ORGANIZED HEALTH CARE EDUCATION/TRAINING PROGRAM

## 2022-10-17 PROCEDURE — 3074F PR MOST RECENT SYSTOLIC BLOOD PRESSURE < 130 MM HG: ICD-10-PCS | Mod: CPTII,S$GLB,, | Performed by: STUDENT IN AN ORGANIZED HEALTH CARE EDUCATION/TRAINING PROGRAM

## 2022-10-17 PROCEDURE — 1159F PR MEDICATION LIST DOCUMENTED IN MEDICAL RECORD: ICD-10-PCS | Mod: CPTII,S$GLB,, | Performed by: STUDENT IN AN ORGANIZED HEALTH CARE EDUCATION/TRAINING PROGRAM

## 2022-10-17 PROCEDURE — 3078F DIAST BP <80 MM HG: CPT | Mod: CPTII,S$GLB,, | Performed by: STUDENT IN AN ORGANIZED HEALTH CARE EDUCATION/TRAINING PROGRAM

## 2022-10-17 PROCEDURE — 3078F PR MOST RECENT DIASTOLIC BLOOD PRESSURE < 80 MM HG: ICD-10-PCS | Mod: CPTII,S$GLB,, | Performed by: STUDENT IN AN ORGANIZED HEALTH CARE EDUCATION/TRAINING PROGRAM

## 2022-10-17 PROCEDURE — 99999 PR PBB SHADOW E&M-EST. PATIENT-LVL IV: ICD-10-PCS | Mod: PBBFAC,,, | Performed by: STUDENT IN AN ORGANIZED HEALTH CARE EDUCATION/TRAINING PROGRAM

## 2022-10-17 PROCEDURE — 1159F MED LIST DOCD IN RCRD: CPT | Mod: CPTII,S$GLB,, | Performed by: STUDENT IN AN ORGANIZED HEALTH CARE EDUCATION/TRAINING PROGRAM

## 2022-10-17 RX ORDER — PANTOPRAZOLE SODIUM 40 MG/1
40 TABLET, DELAYED RELEASE ORAL DAILY
Qty: 30 TABLET | Refills: 11 | Status: SHIPPED | OUTPATIENT
Start: 2022-10-17 | End: 2023-10-17

## 2022-10-17 NOTE — PROGRESS NOTES
"[facility] MEDICINE CLINIC NOTE    Patient Name: Brennan Christine  YOB: 1995    PRESENTING HISTORY     History of Present Illness:  Ms. Brennan Christine is a 27 y.o. female here with numerous complaints.     1.  Stomach pain, burning stabbing. Periumbilical.   Starts 45 mins after taking meds in morning, lasts rest of day.     2.  Headaches- left sided. Feels like inside of head is wet and cold. Pulsating, stabbing.    A/w nausea.    Has had HAs in past.    Has some spots in vision periodically but no visual scotoma.     3.  Sesnsation of palpitations  Occur intermittently.   Without CP  A/w SOB.     4.  Spasm of hasnds  Not associated with hyperventilation.   Some tingling in extremities intermittenlty, but likely not associated.     This is on background of recent thyroidectomy for malignancy.         ROS      OBJECTIVE:   Vital Signs:  Vitals:    10/17/22 1426   BP: 110/70   Pulse: 85   Resp: 18   SpO2: 96%   Weight: 88.2 kg (194 lb 7.1 oz)   Height: 5' 7" (1.702 m)            Physical Exam  Vitals and nursing note reviewed.   Constitutional:       Appearance: She is not diaphoretic.      Comments: Negative Trousseau's sign   HENT:      Head: Normocephalic and atraumatic.      Right Ear: External ear normal.      Left Ear: External ear normal.   Eyes:      General: No scleral icterus.     Conjunctiva/sclera: Conjunctivae normal.      Pupils: Pupils are equal, round, and reactive to light.   Neck:      Thyroid: No thyromegaly.      Comments: Surgical scar anterior neck. No appreciable adenopathy  Cardiovascular:      Rate and Rhythm: Normal rate and regular rhythm.      Heart sounds: Normal heart sounds. No murmur heard.     Comments: Chronic AV malformation left arm.   Pulmonary:      Effort: Pulmonary effort is normal. No respiratory distress.      Breath sounds: Normal breath sounds. No wheezing or rales.   Abdominal:      General: Abdomen is flat. Bowel sounds are normal.      " Palpations: Abdomen is soft.      Tenderness: There is abdominal tenderness (periumbilical). There is no right CVA tenderness, left CVA tenderness, guarding or rebound.   Musculoskeletal:         General: No tenderness or deformity. Normal range of motion.      Cervical back: Normal range of motion and neck supple.      Right lower leg: No edema (trace, non-pitting).      Left lower leg: No edema.   Lymphadenopathy:      Cervical: No cervical adenopathy.   Skin:     General: Skin is warm and dry.      Findings: No erythema or rash.   Neurological:      General: No focal deficit present.      Mental Status: She is alert and oriented to person, place, and time.      Cranial Nerves: No cranial nerve deficit.      Sensory: No sensory deficit.      Motor: No weakness.      Coordination: Coordination normal.      Gait: Gait is intact. Gait normal.      Deep Tendon Reflexes: Reflexes normal.   Psychiatric:         Mood and Affect: Mood and affect normal.         Cognition and Memory: Memory normal.         Judgment: Judgment normal.       ASSESSMENT & PLAN:     Palpitations  -     Comprehensive Metabolic Panel; Future; Expected date: 10/17/2022  -     Magnesium; Future; Expected date: 10/17/2022  -     PHOSPHORUS; Future; Expected date: 10/17/2022  -     TSH; Future; Expected date: 10/17/2022    Abdominal pain, unspecified abdominal location  -     US Abdomen Limited; Future; Expected date: 10/17/2022  -     pantoprazole (PROTONIX) 40 MG tablet; Take 1 tablet (40 mg total) by mouth once daily.  Dispense: 30 tablet; Refill: 11    New onset of headaches  -     CT Head Without Contrast; Future; Expected date: 10/17/2022    I do not have a unifying explanation for her symptoms.   Electrolyte abnormalities would be highest on my list, check that.     Abdo pain most likely gastritis, although cholecystitis is possible. US, treat with PPI.     HAs with recent malignancy diagnosis, get CT     Could pursue Holter monitor for BB for  palpitations.       Chetan Banuelos MD   Internal Medicine

## 2022-10-18 ENCOUNTER — PATIENT MESSAGE (OUTPATIENT)
Dept: FAMILY MEDICINE | Facility: CLINIC | Age: 27
End: 2022-10-18
Payer: MEDICARE

## 2022-10-19 ENCOUNTER — PATIENT MESSAGE (OUTPATIENT)
Dept: FAMILY MEDICINE | Facility: CLINIC | Age: 27
End: 2022-10-19
Payer: MEDICARE

## 2022-10-20 ENCOUNTER — LAB VISIT (OUTPATIENT)
Dept: LAB | Facility: HOSPITAL | Age: 27
End: 2022-10-20
Attending: STUDENT IN AN ORGANIZED HEALTH CARE EDUCATION/TRAINING PROGRAM
Payer: COMMERCIAL

## 2022-10-20 ENCOUNTER — PATIENT MESSAGE (OUTPATIENT)
Dept: FAMILY MEDICINE | Facility: CLINIC | Age: 27
End: 2022-10-20
Payer: MEDICARE

## 2022-10-20 DIAGNOSIS — E89.0 POST-SURGICAL HYPOTHYROIDISM: ICD-10-CM

## 2022-10-20 DIAGNOSIS — E83.51 HYPOCALCEMIA: ICD-10-CM

## 2022-10-20 DIAGNOSIS — R00.2 PALPITATIONS: ICD-10-CM

## 2022-10-20 DIAGNOSIS — C73 PAPILLARY THYROID CARCINOMA: ICD-10-CM

## 2022-10-20 LAB
ALBUMIN SERPL BCP-MCNC: 4 G/DL (ref 3.5–5.2)
ALP SERPL-CCNC: 58 U/L (ref 55–135)
ALT SERPL W/O P-5'-P-CCNC: 12 U/L (ref 10–44)
ANION GAP SERPL CALC-SCNC: 8 MMOL/L (ref 8–16)
AST SERPL-CCNC: 14 U/L (ref 10–40)
BILIRUB SERPL-MCNC: 0.5 MG/DL (ref 0.1–1)
BUN SERPL-MCNC: 11 MG/DL (ref 6–20)
CA-I BLDV-SCNC: 1.16 MMOL/L (ref 1.06–1.42)
CALCIUM SERPL-MCNC: 9.3 MG/DL (ref 8.7–10.5)
CHLORIDE SERPL-SCNC: 104 MMOL/L (ref 95–110)
CO2 SERPL-SCNC: 26 MMOL/L (ref 23–29)
CREAT SERPL-MCNC: 0.8 MG/DL (ref 0.5–1.4)
EST. GFR  (NO RACE VARIABLE): >60 ML/MIN/1.73 M^2
GLUCOSE SERPL-MCNC: 85 MG/DL (ref 70–110)
MAGNESIUM SERPL-MCNC: 2.2 MG/DL (ref 1.6–2.6)
PHOSPHATE SERPL-MCNC: 3.7 MG/DL (ref 2.7–4.5)
POTASSIUM SERPL-SCNC: 4.5 MMOL/L (ref 3.5–5.1)
PROT SERPL-MCNC: 7.1 G/DL (ref 6–8.4)
PTH-INTACT SERPL-MCNC: 48.2 PG/ML (ref 9–77)
SODIUM SERPL-SCNC: 138 MMOL/L (ref 136–145)
T4 FREE SERPL-MCNC: 1.29 NG/DL (ref 0.71–1.51)
TSH SERPL DL<=0.005 MIU/L-ACNC: 0.47 UIU/ML (ref 0.4–4)

## 2022-10-20 PROCEDURE — 82330 ASSAY OF CALCIUM: CPT | Performed by: INTERNAL MEDICINE

## 2022-10-20 PROCEDURE — 86800 THYROGLOBULIN ANTIBODY: CPT | Performed by: INTERNAL MEDICINE

## 2022-10-20 PROCEDURE — 84100 ASSAY OF PHOSPHORUS: CPT | Performed by: STUDENT IN AN ORGANIZED HEALTH CARE EDUCATION/TRAINING PROGRAM

## 2022-10-20 PROCEDURE — 84439 ASSAY OF FREE THYROXINE: CPT | Performed by: INTERNAL MEDICINE

## 2022-10-20 PROCEDURE — 36415 COLL VENOUS BLD VENIPUNCTURE: CPT | Mod: PO | Performed by: INTERNAL MEDICINE

## 2022-10-20 PROCEDURE — 83735 ASSAY OF MAGNESIUM: CPT | Performed by: STUDENT IN AN ORGANIZED HEALTH CARE EDUCATION/TRAINING PROGRAM

## 2022-10-20 PROCEDURE — 80053 COMPREHEN METABOLIC PANEL: CPT | Performed by: INTERNAL MEDICINE

## 2022-10-20 PROCEDURE — 83970 ASSAY OF PARATHORMONE: CPT | Performed by: INTERNAL MEDICINE

## 2022-10-20 PROCEDURE — 84443 ASSAY THYROID STIM HORMONE: CPT | Performed by: INTERNAL MEDICINE

## 2022-10-21 ENCOUNTER — PATIENT MESSAGE (OUTPATIENT)
Dept: FAMILY MEDICINE | Facility: CLINIC | Age: 27
End: 2022-10-21
Payer: MEDICARE

## 2022-10-23 ENCOUNTER — PATIENT MESSAGE (OUTPATIENT)
Dept: FAMILY MEDICINE | Facility: CLINIC | Age: 27
End: 2022-10-23
Payer: MEDICARE

## 2022-10-26 ENCOUNTER — PATIENT MESSAGE (OUTPATIENT)
Dept: FAMILY MEDICINE | Facility: CLINIC | Age: 27
End: 2022-10-26
Payer: MEDICARE

## 2022-10-26 ENCOUNTER — HOSPITAL ENCOUNTER (OUTPATIENT)
Dept: RADIOLOGY | Facility: HOSPITAL | Age: 27
Discharge: HOME | End: 2022-10-26
Attending: STUDENT IN AN ORGANIZED HEALTH CARE EDUCATION/TRAINING PROGRAM
Payer: COMMERCIAL

## 2022-10-26 DIAGNOSIS — R10.9 ABDOMINAL PAIN, UNSPECIFIED ABDOMINAL LOCATION: ICD-10-CM

## 2022-10-26 PROCEDURE — 76705 US ABDOMEN LIMITED: ICD-10-PCS | Mod: 26,,, | Performed by: RADIOLOGY

## 2022-10-26 PROCEDURE — 76705 ECHO EXAM OF ABDOMEN: CPT | Mod: 26,,, | Performed by: RADIOLOGY

## 2022-10-26 PROCEDURE — 76705 ECHO EXAM OF ABDOMEN: CPT | Mod: TC,PO

## 2022-11-01 DIAGNOSIS — C73 METASTASIS FROM THYROID CANCER: ICD-10-CM

## 2022-11-01 DIAGNOSIS — C73 THYROID CANCER: Primary | ICD-10-CM

## 2022-11-01 DIAGNOSIS — C79.9 METASTASIS FROM THYROID CANCER: ICD-10-CM

## 2022-11-01 NOTE — PROGRESS NOTES
Nuclear Medicine Therapy Consult    The patient location is: Home in [place]  The chief complaint leading to consultation is: thyroid cancer    Visit type: audio only; Epic Jeet audiovisual connection was not available    Face to Face time with patient: 40 minutes  50 minutes of total time spent on the encounter, which includes face to face time and non-face to face time preparing to see the patient (eg, review of tests), Obtaining and/or reviewing separately obtained history, Documenting clinical information in the electronic or other health record, Independently interpreting results (not separately reported) and communicating results to the patient/family/caregiver, or Care coordination (not separately reported).     Each patient to whom he or she provides medical services by telemedicine is:  (1) informed of the relationship between the physician and patient and the respective role of any other health care provider with respect to management of the patient; and (2) notified that he or she may decline to receive medical services by telemedicine and may withdraw from such care at any time.    Notes:     Ms. Christine is a 27 year-old woman with papillary thyroid carcinoma.  She underwent total thyroidectomy by Dr. Iglesia Vale on 7/21/22.  Pathology revealed multifocal PTC, the largest deposit measuring 5.0 cm.  There was a close but negative margin.  Seventeen out of 30 lymph nodes contained metastatic PTC, the largest measuring 10.5 mm and one node with extranodal extension.    She is accompanied by her mother for this consultation.     They had questions about the prescribed activity for the treatment, a lifetime cumulative limit for administered activity, and pre-treatment imaging such as a chest x-ray and whole body scan.  We discussed the multiplicity of treatment guidelines for the management of thyroid cancer, the concepts of deterministic and stochastic risks of radiation, and variable utility of  pre-treatment imaging.    The indication for, risks and benefits of, and alternatives to, treatment with radioactive iodine were reviewed with the patient.  Specifically, the risks for nausea and vomiting, temporary or permanent injury to the salivary glands, and secondary malignancies such as leukemia were discussed.  All questions were answered to her satisfaction, and she is undecided whether to proceed with treatment.     The importance of preparation with the low iodine diet was reviewed, and the principles of radiation safety precaution were also reviewed.      She has no special medical needs, and her home and travel situations are appropriate.     Impression/Plan:  Ms. Christine is a 27 year-old woman with metastatic papillary thyroid carcinoma, and she is an appropriate patient for adjuvant treatment of thyroid cancer with I-131 on an outpatient basis. She is undecided whether she wants to proceed with treatment.    Given the status/extent of her disease, an administered activity of approximately 130 mCi of I-131 would be appropriate.  Written radiation safety precautions will be provided, and the informed consent form can be signed on the date of treatment should she decide to undergo treatment.    Scheduling will be deferred until if and when Ms. Christine agrees to undergo treatment.

## 2022-11-03 ENCOUNTER — PATIENT MESSAGE (OUTPATIENT)
Dept: ADMINISTRATIVE | Facility: HOSPITAL | Age: 27
End: 2022-11-03
Payer: MEDICARE

## 2022-11-08 ENCOUNTER — PATIENT MESSAGE (OUTPATIENT)
Dept: FAMILY MEDICINE | Facility: CLINIC | Age: 27
End: 2022-11-08
Payer: MEDICARE

## 2022-11-18 DIAGNOSIS — R10.9 ABDOMINAL PAIN, UNSPECIFIED ABDOMINAL LOCATION: ICD-10-CM

## 2022-11-18 RX ORDER — PANTOPRAZOLE SODIUM 40 MG/1
40 TABLET, DELAYED RELEASE ORAL DAILY
Qty: 30 TABLET | Refills: 11 | Status: CANCELLED | OUTPATIENT
Start: 2022-11-18 | End: 2023-11-18

## 2022-11-18 NOTE — TELEPHONE ENCOUNTER
No new care gaps identified.  [facility] Embedded Care Gaps. Reference number: [id number]. 11/18/2022   10:39:01 AM CST

## 2022-11-21 ENCOUNTER — PATIENT MESSAGE (OUTPATIENT)
Dept: ADMINISTRATIVE | Facility: HOSPITAL | Age: 27
End: 2022-11-21
Payer: MEDICARE

## 2022-11-30 ENCOUNTER — PATIENT OUTREACH (OUTPATIENT)
Dept: ADMINISTRATIVE | Facility: HOSPITAL | Age: 27
End: 2022-11-30
Payer: MEDICARE

## 2022-11-30 ENCOUNTER — PATIENT MESSAGE (OUTPATIENT)
Dept: ADMINISTRATIVE | Facility: HOSPITAL | Age: 27
End: 2022-11-30
Payer: MEDICARE

## 2022-12-05 ENCOUNTER — PATIENT OUTREACH (OUTPATIENT)
Dept: ADMINISTRATIVE | Facility: HOSPITAL | Age: 27
End: 2022-12-05
Payer: MEDICARE

## 2022-12-06 ENCOUNTER — PATIENT MESSAGE (OUTPATIENT)
Dept: ADMINISTRATIVE | Facility: HOSPITAL | Age: 27
End: 2022-12-06
Payer: MEDICARE

## 2022-12-12 NOTE — PROGRESS NOTES
Subjective:      Chief Complaint: Thyroid Cancer and Hypothyroidism      The patient location is: [place]  The chief complaint leading to consultation is: thyroid cancer    Visit type: audiovisual    Face to Face time with patient: 19 minutes  25 minutes of total time spent on the encounter, which includes face to face time and non-face to face time preparing to see the patient (eg, review of tests), Obtaining and/or reviewing separately obtained history, Documenting clinical information in the electronic or other health record, Independently interpreting results (not separately reported) and communicating results to the patient/family/caregiver, or Care coordination (not separately reported).    Each patient to whom he or she provides medical services by telemedicine is:  (1) informed of the relationship between the physician and patient and the respective role of any other health care provider with respect to management of the patient; and (2) notified that he or she may decline to receive medical services by telemedicine and may withdraw from such care at any time.    Notes:    HPI: Brennan Christine is a 27 y.o. female who is having an evaluation for thyroid cancer. Last seen 8/10/2022    For her thyroid cancer:  Nodule found in June. Confirmed on US.  FNA 7/7/2022 + for PTC.  Treated with thyroidectomy on 7/21/2022.    Pathology:  Multifocal PTC.   5x2.8x1.7 cm primary, 2.5x2.5 mm   Margins less than 1 mm  No angioinvasion  No lymphatic invasion  No neural invasion  No ETE  17/30 LN positive. Largest LN 10.5 mm in size.   Extra-siobhan extension present, <1mm    Had recommended radioactive iodine remnant ablation, not done.    Last neck US: before surgery    Last thyroglobulin:  Lab Results   Component Value Date    THYGLBTUM <0.1 10/20/2022    THGABSCRN <1.8 10/20/2022     With regards to her post-surgical hypothyroidism:    Current medication:  generic levothyroxine  Current dose: 112 mcg    Lab Results    Component Value Date    TSH 0.472 10/20/2022    TSH 0.472 10/20/2022    TSH 0.472 10/20/2022    FREET4 1.29 10/20/2022    THYROPEROXID <6.0 06/08/2022     After surgery, needed calcitriol and tums.    Not lately.    Current symptoms:  No   Yes  [x]    []  Trouble swallowing  [x]    []  Trouble breathing  [x]    []  Voice changes  [x]    []  Neck swelling    []    [x]  Fatigue  []    [x]  Constipation/diarrhea. Some of each.  []    [x]  Heat/Cold intolerance.  []    [x]  Weight gain or weight loss  []    [x]  Palpitations or tremor. On smart watch, HR sometimes jumps up to 110s. Most days.  []    [x]  Irregular menstrual cycles. On IUD, doesn't often get any    +headaches  Some numbness/tingling in legs. Also hands.  +dizziness  GI symptoms. Sometimes burning pains.     Reviewed past medical, family, social history and updated as appropriate.    Review of Systems  As above    Objective:   Prior vitals (if available):  BP Readings from Last 5 Encounters:   10/17/22 110/70   08/19/22 102/75   07/25/22 118/79   07/22/22 122/78   07/19/22 119/80     Physical Exam  Constitutional:       General: She is not in acute distress.  Pulmonary:      Effort: Pulmonary effort is normal.       Wt Readings from Last 5 Encounters:   10/17/22 1426 88.2 kg (194 lb 7.1 oz)   08/19/22 0846 88.7 kg (195 lb 8.8 oz)   07/25/22 1407 88.5 kg (195 lb)   07/21/22 0812 88.5 kg (195 lb)   07/19/22 0845 90.1 kg (198 lb 10.2 oz)     No results found for: HGBA1C  Lab Results   Component Value Date    CHOL 180 06/08/2022    HDL 37 (L) 06/08/2022    LDLCALC 120.8 06/08/2022    TRIG 111 06/08/2022    CHOLHDL 20.6 06/08/2022     Lab Results   Component Value Date     10/20/2022     10/20/2022     10/20/2022    K 4.5 10/20/2022    K 4.5 10/20/2022    K 4.5 10/20/2022     10/20/2022     10/20/2022     10/20/2022    CO2 26 10/20/2022    CO2 26 10/20/2022    CO2 26 10/20/2022    GLU 85 10/20/2022    GLU 85 10/20/2022     GLU 85 10/20/2022    BUN 11 10/20/2022    BUN 11 10/20/2022    BUN 11 10/20/2022    CREATININE 0.8 10/20/2022    CREATININE 0.8 10/20/2022    CREATININE 0.8 10/20/2022    CALCIUM 9.3 10/20/2022    CALCIUM 9.3 10/20/2022    CALCIUM 9.3 10/20/2022    PROT 7.1 10/20/2022    PROT 7.1 10/20/2022    PROT 7.1 10/20/2022    ALBUMIN 4.0 10/20/2022    ALBUMIN 4.0 10/20/2022    ALBUMIN 4.0 10/20/2022    BILITOT 0.5 10/20/2022    BILITOT 0.5 10/20/2022    BILITOT 0.5 10/20/2022    ALKPHOS 58 10/20/2022    ALKPHOS 58 10/20/2022    ALKPHOS 58 10/20/2022    AST 14 10/20/2022    AST 14 10/20/2022    AST 14 10/20/2022    ALT 12 10/20/2022    ALT 12 10/20/2022    ALT 12 10/20/2022    ANIONGAP 8 10/20/2022    ANIONGAP 8 10/20/2022    ANIONGAP 8 10/20/2022    ESTGFRAFRICA >60 06/07/2022    EGFRNONAA >60 06/07/2022    TSH 0.472 10/20/2022    TSH 0.472 10/20/2022    TSH 0.472 10/20/2022      No results found for: MICALBCREAT    Assessment/Plan:     Papillary thyroid carcinoma  pathology report reviewed at initial visit. Multifocal PTC, 5 cm primary, 2.5 mm secondary tumor. Margins <1mm.   17/30 LN +, largest 1.05 cm in size.   Extra-siobhan extension present but less than 1 mm extension.    Had recommended radioactive iodine ablation. Pt met with nuc med, deferred iodine treatment at this time.  Discussed again with patient  ТАТЬЯНА at least intermediate risk, towards higher side risk for recurrence. 20-40% chance for recurrence.    Based on this, reviewed that thyroid cancer guidelines recommend radioactive iodine ablation. 100-150 mCi, was trying for 130 mCi with nuc med, but at least 100. Pt concerned about potential complications. Reasonable concerns.  Reviewed a lot of those are rare, compared with a high chance for thyroid cancer recurrence. Pt wants to think about it some more. Encourage pt to reach out to the office if interested in setting that up.    Otherwise will continue to monitor with thyroiglobulin and periodic neck US.    consider thyrogen stimulated thyroglobulin and WBS around 1 year after her initial surgery. Or sooner if it seems like thyroglobulin is trending higher.    For now TSH goal is low.      Post-surgical hypothyroidism  On levothyroxine   last labs normal   clinically having a few potenitally hyperthyroid symptoms   recheck labs when able   continue 112 mcg/day for now. Adjust dose if needed based on results   if labs normal, pt interested in trial of a brand rather than generic. Recommend checking into Synthroid vs Tirosint depending on insurance preferences. If her symptoms are related to additives in generic rather than thyroid hormone levels, those brands would be most likely to help  goal TSH on the low side      Hypocalcemia  After thyroid surgery.   PTH was remaining low   vit D was low as well   calcium improved with supplements, was normal, though slightly low on last check   having some numbness symptoms   recheck with next labs.  Consider resuming regular vitamin D, other supplements if needed    Palpitations  Ensure euthyroid as above.   check metanephrines too   otherwise f/u with PCP    Numbness  Check calcium as above   also b12   otherwise f/u with PCP to consider other causes      Follow up in about 6 months (around 6/13/2023) for lab review, further monitoring.        Brian Patel MD  Endocrinology

## 2022-12-13 ENCOUNTER — PATIENT MESSAGE (OUTPATIENT)
Dept: FAMILY MEDICINE | Facility: CLINIC | Age: 27
End: 2022-12-13
Payer: MEDICARE

## 2022-12-13 ENCOUNTER — OFFICE VISIT (OUTPATIENT)
Dept: ENDOCRINOLOGY | Facility: CLINIC | Age: 27
End: 2022-12-13
Payer: COMMERCIAL

## 2022-12-13 DIAGNOSIS — E89.0 POST-SURGICAL HYPOTHYROIDISM: Primary | ICD-10-CM

## 2022-12-13 DIAGNOSIS — C73 PAPILLARY THYROID CARCINOMA: ICD-10-CM

## 2022-12-13 DIAGNOSIS — R00.2 PALPITATIONS: ICD-10-CM

## 2022-12-13 DIAGNOSIS — R20.0 NUMBNESS: ICD-10-CM

## 2022-12-13 DIAGNOSIS — E83.51 HYPOCALCEMIA: ICD-10-CM

## 2022-12-13 PROCEDURE — 1159F PR MEDICATION LIST DOCUMENTED IN MEDICAL RECORD: ICD-10-PCS | Mod: CPTII,95,, | Performed by: INTERNAL MEDICINE

## 2022-12-13 PROCEDURE — 99214 OFFICE O/P EST MOD 30 MIN: CPT | Mod: 95,,, | Performed by: INTERNAL MEDICINE

## 2022-12-13 PROCEDURE — 1159F MED LIST DOCD IN RCRD: CPT | Mod: CPTII,95,, | Performed by: INTERNAL MEDICINE

## 2022-12-13 PROCEDURE — 1160F PR REVIEW ALL MEDS BY PRESCRIBER/CLIN PHARMACIST DOCUMENTED: ICD-10-PCS | Mod: CPTII,95,, | Performed by: INTERNAL MEDICINE

## 2022-12-13 PROCEDURE — 99214 PR OFFICE/OUTPT VISIT, EST, LEVL IV, 30-39 MIN: ICD-10-PCS | Mod: 95,,, | Performed by: INTERNAL MEDICINE

## 2022-12-13 PROCEDURE — 1160F RVW MEDS BY RX/DR IN RCRD: CPT | Mod: CPTII,95,, | Performed by: INTERNAL MEDICINE

## 2022-12-13 NOTE — ASSESSMENT & PLAN NOTE
After thyroid surgery.   PTH was remaining low   vit D was low as well   calcium improved with supplements, was normal, though slightly low on last check   having some numbness symptoms   recheck with next labs.  Consider resuming regular vitamin D, other supplements if needed

## 2022-12-13 NOTE — ASSESSMENT & PLAN NOTE
pathology report reviewed at initial visit. Multifocal PTC, 5 cm primary, 2.5 mm secondary tumor. Margins <1mm.   17/30 LN +, largest 1.05 cm in size.   Extra-siobhan extension present but less than 1 mm extension.    Had recommended radioactive iodine ablation. Pt met with nuc med, deferred iodine treatment at this time.  Discussed again with patient  ТАТЬЯНА at least intermediate risk, towards higher side risk for recurrence. 20-40% chance for recurrence.    Based on this, reviewed that thyroid cancer guidelines recommend radioactive iodine ablation. 100-150 mCi, was trying for 130 mCi with nuc med, but at least 100. Pt concerned about potential complications. Reasonable concerns.  Reviewed a lot of those are rare, compared with a high chance for thyroid cancer recurrence. Pt wants to think about it some more. Encourage pt to reach out to the office if interested in setting that up.    Otherwise will continue to monitor with thyroiglobulin and periodic neck US.   consider thyrogen stimulated thyroglobulin and WBS around 1 year after her initial surgery. Or sooner if it seems like thyroglobulin is trending higher.    For now TSH goal is low.

## 2022-12-13 NOTE — ASSESSMENT & PLAN NOTE
On levothyroxine   last labs normal   clinically having a few potenitally hyperthyroid symptoms   recheck labs when able   continue 112 mcg/day for now. Adjust dose if needed based on results   if labs normal, pt interested in trial of a brand rather than generic. Recommend checking into Synthroid vs Tirosint depending on insurance preferences. If her symptoms are related to additives in generic rather than thyroid hormone levels, those brands would be most likely to help  goal TSH on the low side

## 2022-12-19 ENCOUNTER — LAB VISIT (OUTPATIENT)
Dept: LAB | Facility: HOSPITAL | Age: 27
End: 2022-12-19
Attending: INTERNAL MEDICINE
Payer: COMMERCIAL

## 2022-12-19 DIAGNOSIS — R00.2 PALPITATIONS: ICD-10-CM

## 2022-12-19 DIAGNOSIS — C73 PAPILLARY THYROID CARCINOMA: ICD-10-CM

## 2022-12-19 DIAGNOSIS — E83.51 HYPOCALCEMIA: ICD-10-CM

## 2022-12-19 DIAGNOSIS — E89.0 POST-SURGICAL HYPOTHYROIDISM: ICD-10-CM

## 2022-12-19 DIAGNOSIS — R20.0 NUMBNESS: ICD-10-CM

## 2022-12-19 LAB
25(OH)D3+25(OH)D2 SERPL-MCNC: 19 NG/ML (ref 30–96)
ALBUMIN SERPL BCP-MCNC: 4.6 G/DL (ref 3.5–5.2)
ALP SERPL-CCNC: 69 U/L (ref 55–135)
ALT SERPL W/O P-5'-P-CCNC: 20 U/L (ref 10–44)
ANION GAP SERPL CALC-SCNC: 12 MMOL/L (ref 8–16)
AST SERPL-CCNC: 18 U/L (ref 10–40)
BILIRUB SERPL-MCNC: 0.6 MG/DL (ref 0.1–1)
BUN SERPL-MCNC: 12 MG/DL (ref 6–20)
CA-I BLDV-SCNC: 1.21 MMOL/L (ref 1.06–1.42)
CALCIUM SERPL-MCNC: 10 MG/DL (ref 8.7–10.5)
CHLORIDE SERPL-SCNC: 101 MMOL/L (ref 95–110)
CO2 SERPL-SCNC: 26 MMOL/L (ref 23–29)
CREAT SERPL-MCNC: 0.8 MG/DL (ref 0.5–1.4)
EST. GFR  (NO RACE VARIABLE): >60 ML/MIN/1.73 M^2
GLUCOSE SERPL-MCNC: 69 MG/DL (ref 70–110)
POTASSIUM SERPL-SCNC: 3.7 MMOL/L (ref 3.5–5.1)
PROT SERPL-MCNC: 8 G/DL (ref 6–8.4)
PTH-INTACT SERPL-MCNC: 29 PG/ML (ref 9–77)
SODIUM SERPL-SCNC: 139 MMOL/L (ref 136–145)
T4 FREE SERPL-MCNC: 1.35 NG/DL (ref 0.71–1.51)
TSH SERPL DL<=0.005 MIU/L-ACNC: 0.29 UIU/ML (ref 0.4–4)
VIT B12 SERPL-MCNC: 308 PG/ML (ref 210–950)

## 2022-12-19 PROCEDURE — 82330 ASSAY OF CALCIUM: CPT | Performed by: INTERNAL MEDICINE

## 2022-12-19 PROCEDURE — 82306 VITAMIN D 25 HYDROXY: CPT | Performed by: INTERNAL MEDICINE

## 2022-12-19 PROCEDURE — 36415 COLL VENOUS BLD VENIPUNCTURE: CPT | Mod: PO | Performed by: INTERNAL MEDICINE

## 2022-12-19 PROCEDURE — 83970 ASSAY OF PARATHORMONE: CPT | Performed by: INTERNAL MEDICINE

## 2022-12-19 PROCEDURE — 83835 ASSAY OF METANEPHRINES: CPT | Performed by: INTERNAL MEDICINE

## 2022-12-19 PROCEDURE — 80053 COMPREHEN METABOLIC PANEL: CPT | Performed by: INTERNAL MEDICINE

## 2022-12-19 PROCEDURE — 82607 VITAMIN B-12: CPT | Performed by: INTERNAL MEDICINE

## 2022-12-19 PROCEDURE — 84439 ASSAY OF FREE THYROXINE: CPT | Performed by: INTERNAL MEDICINE

## 2022-12-19 PROCEDURE — 84443 ASSAY THYROID STIM HORMONE: CPT | Performed by: INTERNAL MEDICINE

## 2022-12-19 PROCEDURE — 86800 THYROGLOBULIN ANTIBODY: CPT | Performed by: INTERNAL MEDICINE

## 2022-12-20 ENCOUNTER — PATIENT MESSAGE (OUTPATIENT)
Dept: ENDOCRINOLOGY | Facility: CLINIC | Age: 27
End: 2022-12-20
Payer: MEDICARE

## 2022-12-20 DIAGNOSIS — E55.9 VITAMIN D INSUFFICIENCY: Primary | ICD-10-CM

## 2022-12-20 DIAGNOSIS — E89.0 POST-SURGICAL HYPOTHYROIDISM: ICD-10-CM

## 2022-12-20 DIAGNOSIS — E83.51 HYPOCALCEMIA: ICD-10-CM

## 2022-12-20 NOTE — TELEPHONE ENCOUNTER
TG pending  Metaneph pending    B12 normal  Ca normal  Vit D low    PTH normal/low side  Ca up to 10.      TSH low, pretty much at target  T4 normal    But was having some symptoms.  Continue 112 mcg/day. EXCEPT take half a tablet on Sundays.    Can decrease Ca intake.  Increase vit D

## 2022-12-21 ENCOUNTER — PATIENT MESSAGE (OUTPATIENT)
Dept: ENDOCRINOLOGY | Facility: CLINIC | Age: 27
End: 2022-12-21
Payer: MEDICARE

## 2022-12-21 RX ORDER — ERGOCALCIFEROL 1.25 MG/1
50000 CAPSULE ORAL
Qty: 12 CAPSULE | Refills: 1 | Status: SHIPPED | OUTPATIENT
Start: 2022-12-21

## 2022-12-30 LAB
METANEPH FREE SERPL-MCNC: <25 PG/ML
METANEPHS SERPL-MCNC: 80 PG/ML
NORMETANEPH FREE SERPL-MCNC: 80 PG/ML

## 2023-01-11 ENCOUNTER — PATIENT MESSAGE (OUTPATIENT)
Dept: FAMILY MEDICINE | Facility: CLINIC | Age: 28
End: 2023-01-11
Payer: MEDICARE

## 2023-01-20 ENCOUNTER — LAB VISIT (OUTPATIENT)
Dept: LAB | Facility: HOSPITAL | Age: 28
End: 2023-01-20
Attending: INTERNAL MEDICINE
Payer: COMMERCIAL

## 2023-01-20 ENCOUNTER — OFFICE VISIT (OUTPATIENT)
Dept: ENDOCRINOLOGY | Facility: CLINIC | Age: 28
End: 2023-01-20
Payer: COMMERCIAL

## 2023-01-20 VITALS
BODY MASS INDEX: 30.2 KG/M2 | WEIGHT: 192.44 LBS | HEART RATE: 70 BPM | TEMPERATURE: 98 F | HEIGHT: 67 IN | OXYGEN SATURATION: 97 % | DIASTOLIC BLOOD PRESSURE: 70 MMHG | SYSTOLIC BLOOD PRESSURE: 100 MMHG

## 2023-01-20 DIAGNOSIS — E88.810 DYSMETABOLIC SYNDROME: ICD-10-CM

## 2023-01-20 DIAGNOSIS — E83.51 HYPOCALCEMIA: ICD-10-CM

## 2023-01-20 DIAGNOSIS — R73.09 DYSGLYCEMIA: ICD-10-CM

## 2023-01-20 DIAGNOSIS — E04.1 THYROID NODULE: ICD-10-CM

## 2023-01-20 DIAGNOSIS — Z97.5 IUD (INTRAUTERINE DEVICE) IN PLACE: ICD-10-CM

## 2023-01-20 DIAGNOSIS — E55.9 HYPOVITAMINOSIS D: ICD-10-CM

## 2023-01-20 DIAGNOSIS — E66.9 OBESITY (BMI 30-39.9): ICD-10-CM

## 2023-01-20 DIAGNOSIS — L91.0 KELOID: ICD-10-CM

## 2023-01-20 DIAGNOSIS — Q27.30 AVM (ARTERIOVENOUS MALFORMATION): ICD-10-CM

## 2023-01-20 DIAGNOSIS — E89.0 POST-SURGICAL HYPOTHYROIDISM: ICD-10-CM

## 2023-01-20 DIAGNOSIS — C73 PAPILLARY THYROID CARCINOMA: ICD-10-CM

## 2023-01-20 DIAGNOSIS — E78.5 DYSLIPIDEMIA: ICD-10-CM

## 2023-01-20 DIAGNOSIS — C73 PAPILLARY THYROID CARCINOMA: Primary | ICD-10-CM

## 2023-01-20 PROCEDURE — 3078F PR MOST RECENT DIASTOLIC BLOOD PRESSURE < 80 MM HG: ICD-10-PCS | Mod: CPTII,S$GLB,, | Performed by: INTERNAL MEDICINE

## 2023-01-20 PROCEDURE — 1159F PR MEDICATION LIST DOCUMENTED IN MEDICAL RECORD: ICD-10-PCS | Mod: CPTII,S$GLB,, | Performed by: INTERNAL MEDICINE

## 2023-01-20 PROCEDURE — 82330 ASSAY OF CALCIUM: CPT | Performed by: INTERNAL MEDICINE

## 2023-01-20 PROCEDURE — 84480 ASSAY TRIIODOTHYRONINE (T3): CPT | Performed by: INTERNAL MEDICINE

## 2023-01-20 PROCEDURE — 84550 ASSAY OF BLOOD/URIC ACID: CPT | Performed by: INTERNAL MEDICINE

## 2023-01-20 PROCEDURE — 99999 PR PBB SHADOW E&M-EST. PATIENT-LVL V: ICD-10-PCS | Mod: PBBFAC,,, | Performed by: INTERNAL MEDICINE

## 2023-01-20 PROCEDURE — 3008F BODY MASS INDEX DOCD: CPT | Mod: CPTII,S$GLB,, | Performed by: INTERNAL MEDICINE

## 2023-01-20 PROCEDURE — 83970 ASSAY OF PARATHORMONE: CPT | Performed by: INTERNAL MEDICINE

## 2023-01-20 PROCEDURE — 82306 VITAMIN D 25 HYDROXY: CPT | Performed by: INTERNAL MEDICINE

## 2023-01-20 PROCEDURE — 1159F MED LIST DOCD IN RCRD: CPT | Mod: CPTII,S$GLB,, | Performed by: INTERNAL MEDICINE

## 2023-01-20 PROCEDURE — 3074F SYST BP LT 130 MM HG: CPT | Mod: CPTII,S$GLB,, | Performed by: INTERNAL MEDICINE

## 2023-01-20 PROCEDURE — 85025 COMPLETE CBC W/AUTO DIFF WBC: CPT | Performed by: INTERNAL MEDICINE

## 2023-01-20 PROCEDURE — 99215 OFFICE O/P EST HI 40 MIN: CPT | Mod: S$GLB,,, | Performed by: INTERNAL MEDICINE

## 2023-01-20 PROCEDURE — 99499 UNLISTED E&M SERVICE: CPT | Mod: S$GLB,,, | Performed by: INTERNAL MEDICINE

## 2023-01-20 PROCEDURE — 99499 RISK ADDL DX/OHS AUDIT: ICD-10-PCS | Mod: S$GLB,,, | Performed by: INTERNAL MEDICINE

## 2023-01-20 PROCEDURE — 86800 THYROGLOBULIN ANTIBODY: CPT | Performed by: INTERNAL MEDICINE

## 2023-01-20 PROCEDURE — 84432 ASSAY OF THYROGLOBULIN: CPT | Performed by: INTERNAL MEDICINE

## 2023-01-20 PROCEDURE — 99999 PR PBB SHADOW E&M-EST. PATIENT-LVL V: CPT | Mod: PBBFAC,,, | Performed by: INTERNAL MEDICINE

## 2023-01-20 PROCEDURE — 86376 MICROSOMAL ANTIBODY EACH: CPT | Performed by: INTERNAL MEDICINE

## 2023-01-20 PROCEDURE — 84439 ASSAY OF FREE THYROXINE: CPT | Performed by: INTERNAL MEDICINE

## 2023-01-20 PROCEDURE — 3074F PR MOST RECENT SYSTOLIC BLOOD PRESSURE < 130 MM HG: ICD-10-PCS | Mod: CPTII,S$GLB,, | Performed by: INTERNAL MEDICINE

## 2023-01-20 PROCEDURE — 80053 COMPREHEN METABOLIC PANEL: CPT | Performed by: INTERNAL MEDICINE

## 2023-01-20 PROCEDURE — 1160F RVW MEDS BY RX/DR IN RCRD: CPT | Mod: CPTII,S$GLB,, | Performed by: INTERNAL MEDICINE

## 2023-01-20 PROCEDURE — 99215 PR OFFICE/OUTPT VISIT, EST, LEVL V, 40-54 MIN: ICD-10-PCS | Mod: S$GLB,,, | Performed by: INTERNAL MEDICINE

## 2023-01-20 PROCEDURE — 80061 LIPID PANEL: CPT | Performed by: INTERNAL MEDICINE

## 2023-01-20 PROCEDURE — 3008F PR BODY MASS INDEX (BMI) DOCUMENTED: ICD-10-PCS | Mod: CPTII,S$GLB,, | Performed by: INTERNAL MEDICINE

## 2023-01-20 PROCEDURE — 84443 ASSAY THYROID STIM HORMONE: CPT | Performed by: INTERNAL MEDICINE

## 2023-01-20 PROCEDURE — 83036 HEMOGLOBIN GLYCOSYLATED A1C: CPT | Performed by: INTERNAL MEDICINE

## 2023-01-20 PROCEDURE — 1160F PR REVIEW ALL MEDS BY PRESCRIBER/CLIN PHARMACIST DOCUMENTED: ICD-10-PCS | Mod: CPTII,S$GLB,, | Performed by: INTERNAL MEDICINE

## 2023-01-20 PROCEDURE — 3078F DIAST BP <80 MM HG: CPT | Mod: CPTII,S$GLB,, | Performed by: INTERNAL MEDICINE

## 2023-01-21 DIAGNOSIS — E89.0 POST-SURGICAL HYPOTHYROIDISM: ICD-10-CM

## 2023-01-21 LAB
25(OH)D3+25(OH)D2 SERPL-MCNC: 22 NG/ML (ref 30–96)
ALBUMIN SERPL BCP-MCNC: 4.6 G/DL (ref 3.5–5.2)
ALP SERPL-CCNC: 55 U/L (ref 55–135)
ALT SERPL W/O P-5'-P-CCNC: 19 U/L (ref 10–44)
ANION GAP SERPL CALC-SCNC: 11 MMOL/L (ref 8–16)
AST SERPL-CCNC: 20 U/L (ref 10–40)
BASOPHILS # BLD AUTO: 0.01 K/UL (ref 0–0.2)
BASOPHILS NFR BLD: 0.1 % (ref 0–1.9)
BILIRUB SERPL-MCNC: 0.4 MG/DL (ref 0.1–1)
BUN SERPL-MCNC: 11 MG/DL (ref 6–20)
CA-I BLDV-SCNC: 1.07 MMOL/L (ref 1.06–1.42)
CALCIUM SERPL-MCNC: 9.8 MG/DL (ref 8.7–10.5)
CHLORIDE SERPL-SCNC: 103 MMOL/L (ref 95–110)
CHOLEST SERPL-MCNC: 183 MG/DL (ref 120–199)
CHOLEST/HDLC SERPL: 5.1 {RATIO} (ref 2–5)
CO2 SERPL-SCNC: 25 MMOL/L (ref 23–29)
CREAT SERPL-MCNC: 0.8 MG/DL (ref 0.5–1.4)
DIFFERENTIAL METHOD: NORMAL
EOSINOPHIL # BLD AUTO: 0.1 K/UL (ref 0–0.5)
EOSINOPHIL NFR BLD: 0.9 % (ref 0–8)
ERYTHROCYTE [DISTWIDTH] IN BLOOD BY AUTOMATED COUNT: 12.8 % (ref 11.5–14.5)
EST. GFR  (NO RACE VARIABLE): >60 ML/MIN/1.73 M^2
ESTIMATED AVG GLUCOSE: 97 MG/DL (ref 68–131)
GLUCOSE SERPL-MCNC: 85 MG/DL (ref 70–110)
HBA1C MFR BLD: 5 % (ref 4–5.6)
HCT VFR BLD AUTO: 41.7 % (ref 37–48.5)
HDLC SERPL-MCNC: 36 MG/DL (ref 40–75)
HDLC SERPL: 19.7 % (ref 20–50)
HGB BLD-MCNC: 13.8 G/DL (ref 12–16)
IMM GRANULOCYTES # BLD AUTO: 0.01 K/UL (ref 0–0.04)
IMM GRANULOCYTES NFR BLD AUTO: 0.1 % (ref 0–0.5)
LDLC SERPL CALC-MCNC: 127.8 MG/DL (ref 63–159)
LYMPHOCYTES # BLD AUTO: 2.8 K/UL (ref 1–4.8)
LYMPHOCYTES NFR BLD: 40.1 % (ref 18–48)
MCH RBC QN AUTO: 30.6 PG (ref 27–31)
MCHC RBC AUTO-ENTMCNC: 33.1 G/DL (ref 32–36)
MCV RBC AUTO: 93 FL (ref 82–98)
MONOCYTES # BLD AUTO: 0.4 K/UL (ref 0.3–1)
MONOCYTES NFR BLD: 5.8 % (ref 4–15)
NEUTROPHILS # BLD AUTO: 3.6 K/UL (ref 1.8–7.7)
NEUTROPHILS NFR BLD: 53 % (ref 38–73)
NONHDLC SERPL-MCNC: 147 MG/DL
NRBC BLD-RTO: 0 /100 WBC
PLATELET # BLD AUTO: 257 K/UL (ref 150–450)
PMV BLD AUTO: 11.4 FL (ref 9.2–12.9)
POTASSIUM SERPL-SCNC: 3.9 MMOL/L (ref 3.5–5.1)
PROT SERPL-MCNC: 7.8 G/DL (ref 6–8.4)
PTH-INTACT SERPL-MCNC: 47.9 PG/ML (ref 9–77)
RBC # BLD AUTO: 4.51 M/UL (ref 4–5.4)
SODIUM SERPL-SCNC: 139 MMOL/L (ref 136–145)
T3 SERPL-MCNC: 119 NG/DL (ref 60–180)
T4 FREE SERPL-MCNC: 1.25 NG/DL (ref 0.71–1.51)
TRIGL SERPL-MCNC: 96 MG/DL (ref 30–150)
TSH SERPL DL<=0.005 MIU/L-ACNC: 0.55 UIU/ML (ref 0.4–4)
URATE SERPL-MCNC: 6.1 MG/DL (ref 2.4–5.7)
WBC # BLD AUTO: 6.85 K/UL (ref 3.9–12.7)

## 2023-01-21 RX ORDER — LEVOTHYROXINE SODIUM 125 UG/1
125 TABLET ORAL
Qty: 90 TABLET | Refills: 3 | Status: SHIPPED | OUTPATIENT
Start: 2023-01-21 | End: 2023-06-26 | Stop reason: SDUPTHER

## 2023-01-23 LAB
THYROGLOB AB SERPL IA-ACNC: <4 IU/ML (ref 0–3.9)
THYROPEROXIDASE IGG SERPL-ACNC: <6 IU/ML

## 2023-01-25 ENCOUNTER — HOSPITAL ENCOUNTER (OUTPATIENT)
Dept: RADIOLOGY | Facility: HOSPITAL | Age: 28
Discharge: HOME | End: 2023-01-25
Attending: INTERNAL MEDICINE
Payer: COMMERCIAL

## 2023-01-25 DIAGNOSIS — C73 PAPILLARY THYROID CARCINOMA: ICD-10-CM

## 2023-01-25 DIAGNOSIS — E89.0 POST-SURGICAL HYPOTHYROIDISM: ICD-10-CM

## 2023-01-25 PROCEDURE — 76536 US EXAM OF HEAD AND NECK: CPT | Mod: 26,,, | Performed by: RADIOLOGY

## 2023-01-25 PROCEDURE — 76536 US EXAM OF HEAD AND NECK: CPT | Mod: TC

## 2023-01-25 PROCEDURE — 76536 US SOFT TISSUE HEAD NECK THYROID: ICD-10-PCS | Mod: 26,,, | Performed by: RADIOLOGY

## 2023-03-02 ENCOUNTER — PATIENT MESSAGE (OUTPATIENT)
Dept: ENDOCRINOLOGY | Facility: CLINIC | Age: 28
End: 2023-03-02
Payer: MEDICARE

## 2023-03-03 ENCOUNTER — LAB VISIT (OUTPATIENT)
Dept: LAB | Facility: HOSPITAL | Age: 28
End: 2023-03-03
Attending: INTERNAL MEDICINE
Payer: MEDICARE

## 2023-03-03 DIAGNOSIS — E89.0 POST-SURGICAL HYPOTHYROIDISM: Primary | ICD-10-CM

## 2023-03-03 DIAGNOSIS — R20.2 NUMBNESS AND TINGLING: ICD-10-CM

## 2023-03-03 DIAGNOSIS — E55.9 HYPOVITAMINOSIS D: ICD-10-CM

## 2023-03-03 DIAGNOSIS — E89.0 POST-SURGICAL HYPOTHYROIDISM: ICD-10-CM

## 2023-03-03 DIAGNOSIS — R20.0 NUMBNESS AND TINGLING: ICD-10-CM

## 2023-03-03 PROCEDURE — 80053 COMPREHEN METABOLIC PANEL: CPT | Performed by: INTERNAL MEDICINE

## 2023-03-03 PROCEDURE — 83735 ASSAY OF MAGNESIUM: CPT | Performed by: INTERNAL MEDICINE

## 2023-03-03 PROCEDURE — 84100 ASSAY OF PHOSPHORUS: CPT | Performed by: INTERNAL MEDICINE

## 2023-03-03 PROCEDURE — 82652 VIT D 1 25-DIHYDROXY: CPT | Performed by: INTERNAL MEDICINE

## 2023-03-03 PROCEDURE — 83970 ASSAY OF PARATHORMONE: CPT | Performed by: INTERNAL MEDICINE

## 2023-03-04 LAB
ALBUMIN SERPL BCP-MCNC: 4.2 G/DL (ref 3.5–5.2)
ALP SERPL-CCNC: 64 U/L (ref 55–135)
ALT SERPL W/O P-5'-P-CCNC: 15 U/L (ref 10–44)
ANION GAP SERPL CALC-SCNC: 6 MMOL/L (ref 8–16)
AST SERPL-CCNC: 13 U/L (ref 10–40)
BILIRUB SERPL-MCNC: 0.4 MG/DL (ref 0.1–1)
BUN SERPL-MCNC: 12 MG/DL (ref 6–20)
CALCIUM SERPL-MCNC: 9.6 MG/DL (ref 8.7–10.5)
CHLORIDE SERPL-SCNC: 104 MMOL/L (ref 95–110)
CO2 SERPL-SCNC: 29 MMOL/L (ref 23–29)
CREAT SERPL-MCNC: 0.8 MG/DL (ref 0.5–1.4)
EST. GFR  (NO RACE VARIABLE): >60 ML/MIN/1.73 M^2
GLUCOSE SERPL-MCNC: 106 MG/DL (ref 70–110)
MAGNESIUM SERPL-MCNC: 1.9 MG/DL (ref 1.6–2.6)
PHOSPHATE SERPL-MCNC: 4.9 MG/DL (ref 2.7–4.5)
POTASSIUM SERPL-SCNC: 4.4 MMOL/L (ref 3.5–5.1)
PROT SERPL-MCNC: 7.5 G/DL (ref 6–8.4)
PTH-INTACT SERPL-MCNC: 36.8 PG/ML (ref 9–77)
SODIUM SERPL-SCNC: 139 MMOL/L (ref 136–145)

## 2023-03-09 LAB — 1,25(OH)2D3 SERPL-MCNC: 31 PG/ML (ref 20–79)

## 2023-03-10 LAB
24R-OH-CALCIDIOL SERPL-MCNC: 3.85 NG/ML
24R-OH-CALCIDIOL SERPL-MCNC: 4.01 NG/ML
25(OH)D2 SERPL-MCNC: 37 NG/ML
25(OH)D2 SERPL-MCNC: 38 NG/ML
25(OH)D3 SERPL-MCNC: 5.5 NG/ML
25(OH)D3 SERPL-MCNC: 5.8 NG/ML
25(OH)D3+25(OH)D2 SERPL-MCNC: 43 NG/ML
25(OH)D3+25(OH)D2 SERPL-MCNC: 44 NG/ML
25HDN:24,25 DIHYDROXY VITD RATIO: 10.72
25HDN:24,25 DIHYDROXY VITD RATIO: 11.43

## 2023-03-31 ENCOUNTER — OFFICE VISIT (OUTPATIENT)
Dept: FAMILY MEDICINE | Facility: CLINIC | Age: 28
End: 2023-03-31
Payer: MEDICARE

## 2023-03-31 VITALS
DIASTOLIC BLOOD PRESSURE: 94 MMHG | SYSTOLIC BLOOD PRESSURE: 100 MMHG | HEART RATE: 83 BPM | BODY MASS INDEX: 31.15 KG/M2 | WEIGHT: 198.44 LBS | HEIGHT: 67 IN | OXYGEN SATURATION: 99 % | RESPIRATION RATE: 18 BRPM

## 2023-03-31 DIAGNOSIS — G43.119 INTRACTABLE MIGRAINE WITH AURA WITHOUT STATUS MIGRAINOSUS: Primary | ICD-10-CM

## 2023-03-31 DIAGNOSIS — R42 DIZZINESS: ICD-10-CM

## 2023-03-31 DIAGNOSIS — G25.71 AKATHISIA: ICD-10-CM

## 2023-03-31 DIAGNOSIS — R00.2 PALPITATIONS: ICD-10-CM

## 2023-03-31 PROCEDURE — 3074F SYST BP LT 130 MM HG: CPT | Mod: CPTII,S$GLB,, | Performed by: STUDENT IN AN ORGANIZED HEALTH CARE EDUCATION/TRAINING PROGRAM

## 2023-03-31 PROCEDURE — 3074F PR MOST RECENT SYSTOLIC BLOOD PRESSURE < 130 MM HG: ICD-10-PCS | Mod: CPTII,S$GLB,, | Performed by: STUDENT IN AN ORGANIZED HEALTH CARE EDUCATION/TRAINING PROGRAM

## 2023-03-31 PROCEDURE — 3044F HG A1C LEVEL LT 7.0%: CPT | Mod: CPTII,S$GLB,, | Performed by: STUDENT IN AN ORGANIZED HEALTH CARE EDUCATION/TRAINING PROGRAM

## 2023-03-31 PROCEDURE — 1160F RVW MEDS BY RX/DR IN RCRD: CPT | Mod: CPTII,S$GLB,, | Performed by: STUDENT IN AN ORGANIZED HEALTH CARE EDUCATION/TRAINING PROGRAM

## 2023-03-31 PROCEDURE — 1159F MED LIST DOCD IN RCRD: CPT | Mod: CPTII,S$GLB,, | Performed by: STUDENT IN AN ORGANIZED HEALTH CARE EDUCATION/TRAINING PROGRAM

## 2023-03-31 PROCEDURE — 99999 PR PBB SHADOW E&M-EST. PATIENT-LVL IV: CPT | Mod: PBBFAC,,, | Performed by: STUDENT IN AN ORGANIZED HEALTH CARE EDUCATION/TRAINING PROGRAM

## 2023-03-31 PROCEDURE — 99213 OFFICE O/P EST LOW 20 MIN: CPT | Mod: S$GLB,,, | Performed by: STUDENT IN AN ORGANIZED HEALTH CARE EDUCATION/TRAINING PROGRAM

## 2023-03-31 PROCEDURE — 3008F BODY MASS INDEX DOCD: CPT | Mod: CPTII,S$GLB,, | Performed by: STUDENT IN AN ORGANIZED HEALTH CARE EDUCATION/TRAINING PROGRAM

## 2023-03-31 PROCEDURE — 3080F PR MOST RECENT DIASTOLIC BLOOD PRESSURE >= 90 MM HG: ICD-10-PCS | Mod: CPTII,S$GLB,, | Performed by: STUDENT IN AN ORGANIZED HEALTH CARE EDUCATION/TRAINING PROGRAM

## 2023-03-31 PROCEDURE — 1160F PR REVIEW ALL MEDS BY PRESCRIBER/CLIN PHARMACIST DOCUMENTED: ICD-10-PCS | Mod: CPTII,S$GLB,, | Performed by: STUDENT IN AN ORGANIZED HEALTH CARE EDUCATION/TRAINING PROGRAM

## 2023-03-31 PROCEDURE — 3044F PR MOST RECENT HEMOGLOBIN A1C LEVEL <7.0%: ICD-10-PCS | Mod: CPTII,S$GLB,, | Performed by: STUDENT IN AN ORGANIZED HEALTH CARE EDUCATION/TRAINING PROGRAM

## 2023-03-31 PROCEDURE — 99999 PR PBB SHADOW E&M-EST. PATIENT-LVL IV: ICD-10-PCS | Mod: PBBFAC,,, | Performed by: STUDENT IN AN ORGANIZED HEALTH CARE EDUCATION/TRAINING PROGRAM

## 2023-03-31 PROCEDURE — 99213 PR OFFICE/OUTPT VISIT, EST, LEVL III, 20-29 MIN: ICD-10-PCS | Mod: S$GLB,,, | Performed by: STUDENT IN AN ORGANIZED HEALTH CARE EDUCATION/TRAINING PROGRAM

## 2023-03-31 PROCEDURE — 3080F DIAST BP >= 90 MM HG: CPT | Mod: CPTII,S$GLB,, | Performed by: STUDENT IN AN ORGANIZED HEALTH CARE EDUCATION/TRAINING PROGRAM

## 2023-03-31 PROCEDURE — 1159F PR MEDICATION LIST DOCUMENTED IN MEDICAL RECORD: ICD-10-PCS | Mod: CPTII,S$GLB,, | Performed by: STUDENT IN AN ORGANIZED HEALTH CARE EDUCATION/TRAINING PROGRAM

## 2023-03-31 PROCEDURE — 3008F PR BODY MASS INDEX (BMI) DOCUMENTED: ICD-10-PCS | Mod: CPTII,S$GLB,, | Performed by: STUDENT IN AN ORGANIZED HEALTH CARE EDUCATION/TRAINING PROGRAM

## 2023-03-31 RX ORDER — METOPROLOL SUCCINATE 25 MG/1
25 TABLET, EXTENDED RELEASE ORAL DAILY
COMMUNITY

## 2023-03-31 RX ORDER — SUMATRIPTAN 50 MG/1
50 TABLET, FILM COATED ORAL
Qty: 10 TABLET | Refills: 0 | Status: SHIPPED | OUTPATIENT
Start: 2023-03-31 | End: 2023-04-30

## 2023-03-31 NOTE — PROGRESS NOTES
"[facility] MEDICINE CLINIC NOTE    Patient Name: Brennan Christine  YOB: 1995    PRESENTING HISTORY     History of Present Illness:  Ms. Brennan Christine is a 28 y.o. female here with several issues.     1.  Bad headaches- starts left temple, feels like right behind eyeball. Pounding.    Always on left side.    A/w nausea.    Last week lasted Monday to Thursday.    Took excedrin migraine. Used to work. Fioricet also didn't help.      2.  Dizziness, even while sleeping.     3.  Shortness of breath. Inhaler does help. On levabuterol. Per pulm.    Random. Sometimes laying down. Sometimes sitting.     4.  Feels like she is coming out of skin. Has to move. Can't stop moving.    Maybe improved since stopping vit d      ROS      OBJECTIVE:   Vital Signs:  Vitals:    03/31/23 1323   BP: (!) 100/94   Pulse: 83   Resp: 18   SpO2: 99%   Weight: 90 kg (198 lb 6.6 oz)   Height: 5' 7" (1.702 m)            Physical Exam  Vitals and nursing note reviewed.   Constitutional:       Appearance: She is not diaphoretic.   HENT:      Head: Normocephalic and atraumatic.      Right Ear: Tympanic membrane and external ear normal.      Left Ear: Tympanic membrane and external ear normal.   Eyes:      General: No scleral icterus.     Conjunctiva/sclera: Conjunctivae normal.      Pupils: Pupils are equal, round, and reactive to light.   Neck:      Thyroid: No thyromegaly.      Comments: Reproduction of dizziness with left carotid massage  Cardiovascular:      Rate and Rhythm: Normal rate and regular rhythm.      Heart sounds: Normal heart sounds. No murmur heard.  Pulmonary:      Effort: Pulmonary effort is normal. No respiratory distress.      Breath sounds: Normal breath sounds. No wheezing or rales.   Musculoskeletal:         General: No tenderness or deformity. Normal range of motion.      Cervical back: Normal range of motion and neck supple.   Lymphadenopathy:      Cervical: No cervical adenopathy.   Skin:     " General: Skin is warm and dry.      Findings: No erythema or rash.   Neurological:      Mental Status: She is alert and oriented to person, place, and time.      Cranial Nerves: No cranial nerve deficit.      Sensory: No sensory deficit.      Motor: No weakness.      Coordination: Coordination normal.      Gait: Gait is intact. Gait normal.      Deep Tendon Reflexes: Reflexes normal.   Psychiatric:         Mood and Affect: Mood and affect normal.         Cognition and Memory: Memory normal.         Judgment: Judgment normal.       ASSESSMENT & PLAN:     Intractable migraine with aura without status migrainosus  -     sumatriptan (IMITREX) 50 MG tablet; Take 1 tablet (50 mg total) by mouth every 2 (two) hours as needed for Migraine.  Dispense: 10 tablet; Refill: 0  -     CT Head Without Contrast; Future; Expected date: 03/31/2023  Classic migraine description. Trial of abortive  With cancer hx, getting head imaging.     Akathisia  I don't have a good explanation for this.     Palpitations  -     Tilt table; Future  Reproduced with carotid massage. Tilt table next. ?POTS    Dizziness  -     CT Head Without Contrast; Future; Expected date: 03/31/2023  -     Tilt table; Future             Chetan Banuelos MD   Internal Medicine

## 2023-05-04 ENCOUNTER — PATIENT MESSAGE (OUTPATIENT)
Dept: ENDOCRINOLOGY | Facility: CLINIC | Age: 28
End: 2023-05-04
Payer: MEDICARE

## 2023-05-04 DIAGNOSIS — C73 PAPILLARY THYROID CARCINOMA: Primary | ICD-10-CM

## 2023-05-04 DIAGNOSIS — E89.0 POST-SURGICAL HYPOTHYROIDISM: ICD-10-CM

## 2023-05-09 ENCOUNTER — LAB VISIT (OUTPATIENT)
Dept: LAB | Facility: HOSPITAL | Age: 28
End: 2023-05-09
Attending: INTERNAL MEDICINE
Payer: MEDICARE

## 2023-05-09 DIAGNOSIS — E89.0 POST-SURGICAL HYPOTHYROIDISM: ICD-10-CM

## 2023-05-09 DIAGNOSIS — C73 PAPILLARY THYROID CARCINOMA: ICD-10-CM

## 2023-05-09 LAB
T3 SERPL-MCNC: 112 NG/DL (ref 60–180)
T4 FREE SERPL-MCNC: 1.21 NG/DL (ref 0.71–1.51)
TSH SERPL DL<=0.005 MIU/L-ACNC: 0.05 UIU/ML (ref 0.4–4)

## 2023-05-09 PROCEDURE — 86800 THYROGLOBULIN ANTIBODY: CPT | Performed by: INTERNAL MEDICINE

## 2023-05-09 PROCEDURE — 84443 ASSAY THYROID STIM HORMONE: CPT | Performed by: INTERNAL MEDICINE

## 2023-05-09 PROCEDURE — 84480 ASSAY TRIIODOTHYRONINE (T3): CPT | Performed by: INTERNAL MEDICINE

## 2023-05-09 PROCEDURE — 86800 THYROGLOBULIN ANTIBODY: CPT | Mod: 91 | Performed by: INTERNAL MEDICINE

## 2023-05-09 PROCEDURE — 86376 MICROSOMAL ANTIBODY EACH: CPT | Performed by: INTERNAL MEDICINE

## 2023-05-09 PROCEDURE — 84439 ASSAY OF FREE THYROXINE: CPT | Performed by: INTERNAL MEDICINE

## 2023-05-10 LAB
THYROGLOB AB SERPL IA-ACNC: <4 IU/ML (ref 0–3.9)
THYROPEROXIDASE IGG SERPL-ACNC: <6 IU/ML

## 2023-06-26 ENCOUNTER — PATIENT MESSAGE (OUTPATIENT)
Dept: ENDOCRINOLOGY | Facility: CLINIC | Age: 28
End: 2023-06-26
Payer: MEDICARE

## 2023-06-26 DIAGNOSIS — E89.0 POST-SURGICAL HYPOTHYROIDISM: ICD-10-CM

## 2023-06-27 RX ORDER — LEVOTHYROXINE SODIUM 125 UG/1
125 TABLET ORAL
Qty: 90 TABLET | Refills: 3 | Status: SHIPPED | OUTPATIENT
Start: 2023-06-27 | End: 2024-01-11

## 2023-11-22 ENCOUNTER — OFFICE VISIT (OUTPATIENT)
Dept: FAMILY MEDICINE | Facility: CLINIC | Age: 28
End: 2023-11-22
Payer: MEDICARE

## 2023-11-22 ENCOUNTER — LAB VISIT (OUTPATIENT)
Dept: LAB | Facility: HOSPITAL | Age: 28
End: 2023-11-22
Attending: STUDENT IN AN ORGANIZED HEALTH CARE EDUCATION/TRAINING PROGRAM
Payer: MEDICARE

## 2023-11-22 VITALS
RESPIRATION RATE: 18 BRPM | WEIGHT: 194.25 LBS | SYSTOLIC BLOOD PRESSURE: 110 MMHG | HEIGHT: 67 IN | OXYGEN SATURATION: 98 % | DIASTOLIC BLOOD PRESSURE: 80 MMHG | HEART RATE: 81 BPM | BODY MASS INDEX: 30.49 KG/M2

## 2023-11-22 DIAGNOSIS — C73 PAPILLARY THYROID CARCINOMA: ICD-10-CM

## 2023-11-22 DIAGNOSIS — R20.2 PARESTHESIAS: ICD-10-CM

## 2023-11-22 DIAGNOSIS — Z00.00 ROUTINE GENERAL MEDICAL EXAMINATION AT A HEALTH CARE FACILITY: Primary | ICD-10-CM

## 2023-11-22 DIAGNOSIS — Z80.0 FAMILY HISTORY OF PANCREATIC CANCER: ICD-10-CM

## 2023-11-22 DIAGNOSIS — Z00.00 ROUTINE GENERAL MEDICAL EXAMINATION AT A HEALTH CARE FACILITY: ICD-10-CM

## 2023-11-22 LAB
ALBUMIN SERPL BCP-MCNC: 3.9 G/DL (ref 3.5–5.2)
ALP SERPL-CCNC: 60 U/L (ref 55–135)
ALT SERPL W/O P-5'-P-CCNC: 14 U/L (ref 10–44)
ANION GAP SERPL CALC-SCNC: 13 MMOL/L (ref 8–16)
AST SERPL-CCNC: 14 U/L (ref 10–40)
BASOPHILS # BLD AUTO: 0 K/UL (ref 0–0.2)
BASOPHILS NFR BLD: 0 % (ref 0–1.9)
BILIRUB SERPL-MCNC: 0.2 MG/DL (ref 0.1–1)
BUN SERPL-MCNC: 12 MG/DL (ref 6–20)
CALCIUM SERPL-MCNC: 9 MG/DL (ref 8.7–10.5)
CHLORIDE SERPL-SCNC: 106 MMOL/L (ref 95–110)
CO2 SERPL-SCNC: 21 MMOL/L (ref 23–29)
CREAT SERPL-MCNC: 0.8 MG/DL (ref 0.5–1.4)
DIFFERENTIAL METHOD: NORMAL
EOSINOPHIL # BLD AUTO: 0.1 K/UL (ref 0–0.5)
EOSINOPHIL NFR BLD: 0.8 % (ref 0–8)
ERYTHROCYTE [DISTWIDTH] IN BLOOD BY AUTOMATED COUNT: 12.4 % (ref 11.5–14.5)
EST. GFR  (NO RACE VARIABLE): >60 ML/MIN/1.73 M^2
FERRITIN SERPL-MCNC: 204 NG/ML (ref 20–300)
GLUCOSE SERPL-MCNC: 80 MG/DL (ref 70–110)
HCT VFR BLD AUTO: 38.3 % (ref 37–48.5)
HGB BLD-MCNC: 12.9 G/DL (ref 12–16)
IMM GRANULOCYTES # BLD AUTO: 0.03 K/UL (ref 0–0.04)
IMM GRANULOCYTES NFR BLD AUTO: 0.4 % (ref 0–0.5)
IRON SERPL-MCNC: 59 UG/DL (ref 30–160)
LYMPHOCYTES # BLD AUTO: 2.4 K/UL (ref 1–4.8)
LYMPHOCYTES NFR BLD: 33.1 % (ref 18–48)
MAGNESIUM SERPL-MCNC: 2 MG/DL (ref 1.6–2.6)
MCH RBC QN AUTO: 30.2 PG (ref 27–31)
MCHC RBC AUTO-ENTMCNC: 33.7 G/DL (ref 32–36)
MCV RBC AUTO: 90 FL (ref 82–98)
MONOCYTES # BLD AUTO: 0.5 K/UL (ref 0.3–1)
MONOCYTES NFR BLD: 6.8 % (ref 4–15)
NEUTROPHILS # BLD AUTO: 4.3 K/UL (ref 1.8–7.7)
NEUTROPHILS NFR BLD: 58.9 % (ref 38–73)
NRBC BLD-RTO: 0 /100 WBC
PLATELET # BLD AUTO: 224 K/UL (ref 150–450)
PMV BLD AUTO: 11.4 FL (ref 9.2–12.9)
POTASSIUM SERPL-SCNC: 4.3 MMOL/L (ref 3.5–5.1)
PROT SERPL-MCNC: 7.1 G/DL (ref 6–8.4)
RBC # BLD AUTO: 4.27 M/UL (ref 4–5.4)
SATURATED IRON: 18 % (ref 20–50)
SODIUM SERPL-SCNC: 140 MMOL/L (ref 136–145)
T4 FREE SERPL-MCNC: 1.32 NG/DL (ref 0.71–1.51)
TOTAL IRON BINDING CAPACITY: 323 UG/DL (ref 250–450)
TRANSFERRIN SERPL-MCNC: 218 MG/DL (ref 200–375)
TSH SERPL DL<=0.005 MIU/L-ACNC: 0.05 UIU/ML (ref 0.4–4)
VIT B12 SERPL-MCNC: 387 PG/ML (ref 210–950)
WBC # BLD AUTO: 7.34 K/UL (ref 3.9–12.7)

## 2023-11-22 PROCEDURE — 84425 ASSAY OF VITAMIN B-1: CPT | Performed by: STUDENT IN AN ORGANIZED HEALTH CARE EDUCATION/TRAINING PROGRAM

## 2023-11-22 PROCEDURE — 3044F HG A1C LEVEL LT 7.0%: CPT | Mod: CPTII,S$GLB,, | Performed by: STUDENT IN AN ORGANIZED HEALTH CARE EDUCATION/TRAINING PROGRAM

## 2023-11-22 PROCEDURE — 1160F PR REVIEW ALL MEDS BY PRESCRIBER/CLIN PHARMACIST DOCUMENTED: ICD-10-PCS | Mod: CPTII,S$GLB,, | Performed by: STUDENT IN AN ORGANIZED HEALTH CARE EDUCATION/TRAINING PROGRAM

## 2023-11-22 PROCEDURE — 82728 ASSAY OF FERRITIN: CPT | Performed by: STUDENT IN AN ORGANIZED HEALTH CARE EDUCATION/TRAINING PROGRAM

## 2023-11-22 PROCEDURE — 3008F BODY MASS INDEX DOCD: CPT | Mod: CPTII,S$GLB,, | Performed by: STUDENT IN AN ORGANIZED HEALTH CARE EDUCATION/TRAINING PROGRAM

## 2023-11-22 PROCEDURE — 3079F PR MOST RECENT DIASTOLIC BLOOD PRESSURE 80-89 MM HG: ICD-10-PCS | Mod: CPTII,S$GLB,, | Performed by: STUDENT IN AN ORGANIZED HEALTH CARE EDUCATION/TRAINING PROGRAM

## 2023-11-22 PROCEDURE — 3079F DIAST BP 80-89 MM HG: CPT | Mod: CPTII,S$GLB,, | Performed by: STUDENT IN AN ORGANIZED HEALTH CARE EDUCATION/TRAINING PROGRAM

## 2023-11-22 PROCEDURE — 36415 COLL VENOUS BLD VENIPUNCTURE: CPT | Mod: PO | Performed by: STUDENT IN AN ORGANIZED HEALTH CARE EDUCATION/TRAINING PROGRAM

## 2023-11-22 PROCEDURE — 1159F MED LIST DOCD IN RCRD: CPT | Mod: CPTII,S$GLB,, | Performed by: STUDENT IN AN ORGANIZED HEALTH CARE EDUCATION/TRAINING PROGRAM

## 2023-11-22 PROCEDURE — 99213 PR OFFICE/OUTPT VISIT, EST, LEVL III, 20-29 MIN: ICD-10-PCS | Mod: S$GLB,,, | Performed by: STUDENT IN AN ORGANIZED HEALTH CARE EDUCATION/TRAINING PROGRAM

## 2023-11-22 PROCEDURE — 1159F PR MEDICATION LIST DOCUMENTED IN MEDICAL RECORD: ICD-10-PCS | Mod: CPTII,S$GLB,, | Performed by: STUDENT IN AN ORGANIZED HEALTH CARE EDUCATION/TRAINING PROGRAM

## 2023-11-22 PROCEDURE — 99999 PR PBB SHADOW E&M-EST. PATIENT-LVL IV: ICD-10-PCS | Mod: PBBFAC,,, | Performed by: STUDENT IN AN ORGANIZED HEALTH CARE EDUCATION/TRAINING PROGRAM

## 2023-11-22 PROCEDURE — 1160F RVW MEDS BY RX/DR IN RCRD: CPT | Mod: CPTII,S$GLB,, | Performed by: STUDENT IN AN ORGANIZED HEALTH CARE EDUCATION/TRAINING PROGRAM

## 2023-11-22 PROCEDURE — 84439 ASSAY OF FREE THYROXINE: CPT | Performed by: STUDENT IN AN ORGANIZED HEALTH CARE EDUCATION/TRAINING PROGRAM

## 2023-11-22 PROCEDURE — 82607 VITAMIN B-12: CPT | Performed by: STUDENT IN AN ORGANIZED HEALTH CARE EDUCATION/TRAINING PROGRAM

## 2023-11-22 PROCEDURE — 99999 PR PBB SHADOW E&M-EST. PATIENT-LVL IV: CPT | Mod: PBBFAC,,, | Performed by: STUDENT IN AN ORGANIZED HEALTH CARE EDUCATION/TRAINING PROGRAM

## 2023-11-22 PROCEDURE — 83540 ASSAY OF IRON: CPT | Performed by: STUDENT IN AN ORGANIZED HEALTH CARE EDUCATION/TRAINING PROGRAM

## 2023-11-22 PROCEDURE — 83735 ASSAY OF MAGNESIUM: CPT | Performed by: STUDENT IN AN ORGANIZED HEALTH CARE EDUCATION/TRAINING PROGRAM

## 2023-11-22 PROCEDURE — 83525 ASSAY OF INSULIN: CPT | Performed by: STUDENT IN AN ORGANIZED HEALTH CARE EDUCATION/TRAINING PROGRAM

## 2023-11-22 PROCEDURE — 80053 COMPREHEN METABOLIC PANEL: CPT | Performed by: STUDENT IN AN ORGANIZED HEALTH CARE EDUCATION/TRAINING PROGRAM

## 2023-11-22 PROCEDURE — 3008F PR BODY MASS INDEX (BMI) DOCUMENTED: ICD-10-PCS | Mod: CPTII,S$GLB,, | Performed by: STUDENT IN AN ORGANIZED HEALTH CARE EDUCATION/TRAINING PROGRAM

## 2023-11-22 PROCEDURE — 3074F SYST BP LT 130 MM HG: CPT | Mod: CPTII,S$GLB,, | Performed by: STUDENT IN AN ORGANIZED HEALTH CARE EDUCATION/TRAINING PROGRAM

## 2023-11-22 PROCEDURE — 3044F PR MOST RECENT HEMOGLOBIN A1C LEVEL <7.0%: ICD-10-PCS | Mod: CPTII,S$GLB,, | Performed by: STUDENT IN AN ORGANIZED HEALTH CARE EDUCATION/TRAINING PROGRAM

## 2023-11-22 PROCEDURE — 99213 OFFICE O/P EST LOW 20 MIN: CPT | Mod: S$GLB,,, | Performed by: STUDENT IN AN ORGANIZED HEALTH CARE EDUCATION/TRAINING PROGRAM

## 2023-11-22 PROCEDURE — 84466 ASSAY OF TRANSFERRIN: CPT | Performed by: STUDENT IN AN ORGANIZED HEALTH CARE EDUCATION/TRAINING PROGRAM

## 2023-11-22 PROCEDURE — 84443 ASSAY THYROID STIM HORMONE: CPT | Performed by: STUDENT IN AN ORGANIZED HEALTH CARE EDUCATION/TRAINING PROGRAM

## 2023-11-22 PROCEDURE — 3074F PR MOST RECENT SYSTOLIC BLOOD PRESSURE < 130 MM HG: ICD-10-PCS | Mod: CPTII,S$GLB,, | Performed by: STUDENT IN AN ORGANIZED HEALTH CARE EDUCATION/TRAINING PROGRAM

## 2023-11-22 PROCEDURE — 85025 COMPLETE CBC W/AUTO DIFF WBC: CPT | Performed by: STUDENT IN AN ORGANIZED HEALTH CARE EDUCATION/TRAINING PROGRAM

## 2023-11-22 NOTE — PROGRESS NOTES
"[facility] MEDICINE CLINIC NOTE    Patient Name: Brennan Christine  YOB: 1995    PRESENTING HISTORY     History of Present Illness:  Ms. Brennan Christine is a 28 y.o. female here for annual.     C/o profound fatigue.     C/op weight gain.     C/o swelling.     Fhx: grandfather recently diagnosed with pancreatic cancer at age 69.       Tingling- both hands and affects face sometimes. Takes tums, doesn't really help. Will sometimes get milder symptoms in feet.     ROS      OBJECTIVE:   Vital Signs:  Vitals:    11/22/23 1604   BP: 110/80   Pulse: 81   Resp: 18   SpO2: 98%   Weight: 88.1 kg (194 lb 3.6 oz)   Height: 5' 7" (1.702 m)          Physical Exam: Normal, no change.     Physical Exam    ASSESSMENT & PLAN:     Routine general medical examination at a health care facility  -     TSH; Future; Expected date: 11/22/2023  -     COMPREHENSIVE METABOLIC PANEL; Future; Expected date: 11/22/2023  -     Insulin, random; Future; Expected date: 11/22/2023  -     Magnesium; Future; Expected date: 11/22/2023  -     CBC W/ AUTO DIFFERENTIAL; Future; Expected date: 11/22/2023    Family history of pancreatic cancer  -     Ambulatory referral/consult to Genetics; Future; Expected date: 11/29/2023    Paresthesias  -     VITAMIN B12; Future; Expected date: 11/22/2023  -     VITAMIN B1; Future; Expected date: 11/22/2023  -     FERRITIN; Future; Expected date: 11/22/2023  -     IRON AND TIBC; Future; Expected date: 11/22/2023    Papillary thyroid carcinoma  -     TSH; Future; Expected date: 11/22/2023  -     COMPREHENSIVE METABOLIC PANEL; Future; Expected date: 11/22/2023  -     Insulin, random; Future; Expected date: 11/22/2023  -     Magnesium; Future; Expected date: 11/22/2023  -     CBC W/ AUTO DIFFERENTIAL; Future; Expected date: 11/22/2023  -     VITAMIN B12; Future; Expected date: 11/22/2023  -     VITAMIN B1; Future; Expected date: 11/22/2023  -     FERRITIN; Future; Expected date: 11/22/2023  -    "  IRON AND TIBC; Future; Expected date: 11/22/2023  Stable, continue current medications             Chetan Banuelos  Internal Medicine

## 2023-11-24 LAB
INSULIN COLLECTION INTERVAL: NORMAL
INSULIN SERPL-ACNC: 10.6 UU/ML

## 2023-11-28 LAB — VIT B1 BLD-MCNC: 55 UG/L (ref 38–122)

## 2024-01-09 ENCOUNTER — PATIENT MESSAGE (OUTPATIENT)
Dept: ENDOCRINOLOGY | Facility: CLINIC | Age: 29
End: 2024-01-09

## 2024-01-09 ENCOUNTER — OFFICE VISIT (OUTPATIENT)
Dept: ENDOCRINOLOGY | Facility: CLINIC | Age: 29
End: 2024-01-09
Payer: MEDICARE

## 2024-01-09 DIAGNOSIS — C73 PAPILLARY THYROID CARCINOMA: ICD-10-CM

## 2024-01-09 DIAGNOSIS — E89.0 POST-SURGICAL HYPOTHYROIDISM: Primary | ICD-10-CM

## 2024-01-09 PROCEDURE — 99499 UNLISTED E&M SERVICE: CPT | Mod: 95,,, | Performed by: INTERNAL MEDICINE

## 2024-01-11 ENCOUNTER — OFFICE VISIT (OUTPATIENT)
Dept: ENDOCRINOLOGY | Facility: CLINIC | Age: 29
End: 2024-01-11
Payer: MEDICARE

## 2024-01-11 DIAGNOSIS — C73 PAPILLARY THYROID CARCINOMA: ICD-10-CM

## 2024-01-11 DIAGNOSIS — E89.0 POST-SURGICAL HYPOTHYROIDISM: Primary | ICD-10-CM

## 2024-01-11 PROCEDURE — 99214 OFFICE O/P EST MOD 30 MIN: CPT | Mod: 95,,, | Performed by: INTERNAL MEDICINE

## 2024-01-11 RX ORDER — LEVOTHYROXINE SODIUM 100 UG/1
100 TABLET ORAL
Qty: 30 TABLET | Refills: 11 | Status: SHIPPED | OUTPATIENT
Start: 2024-01-11 | End: 2024-02-26

## 2024-01-11 RX ORDER — LIOTHYRONINE SODIUM 5 UG/1
TABLET ORAL
Qty: 15 TABLET | Refills: 11 | Status: SHIPPED | OUTPATIENT
Start: 2024-01-11

## 2024-01-11 NOTE — ASSESSMENT & PLAN NOTE
"--Patient s/p total thyroidectomy and central/upper mediastinal LN dissection in 7/2022  --With multifocal bilateral PTC, classic variant, largest foci in the right lobe at 5 cm, no ETE or lymphvasc invasion, 17/30 nodes positive in level VI, with NORMAN  --ТАТЬЯНА intermediate risk for recurrence, stage 1  --After discussions with Karen Avila, Camila and Mily she decided against CR ablation due to concern for side effects, and risk/benefits given intermediate recurrence risk, undetectable Tg and no structural findings on neck ultrasound  --I think this is a reasonable approach  --Reviewed with her that if PTC recurs it generally recurs in the neck and repeat surgery may be indicated  --Currently with ТАТЬЯНА "excellent" response to therapy  --Will repeat thyroglobulin and ultrasound now  --TSH goal 0.1-0.5 at this time  "

## 2024-01-11 NOTE — ASSESSMENT & PLAN NOTE
--Patient with post-operative hypothyroidism  --Having some symptoms that could be related to hypothyroidism, however, these symptoms are non-specific and her TSH has been suppressed  --Reviewed options  --She would like to try combined synthetic therapy with Synthroid and liothyronine  --Will change to brand name Synthroid and decrease dose to 100 mcg  --Will start liothyronine 2.5 mcg BID  --TSH goal 0.1-0.5  --Repeat TSH in 5-6 weeks

## 2024-01-11 NOTE — PROGRESS NOTES
Subjective:      Patient ID: Brennan Christine is a 28 y.o. female.    Chief Complaint:  Hypothyroidism    The patient location is: Home  The chief complaint leading to consultation is: Hypothyroidism, thyroid cancer    Visit type: audiovisual    Face to Face time with patient: 15 min  30 minutes of total time spent on the encounter, which includes face to face time and non-face to face time preparing to see the patient (eg, review of tests), Obtaining and/or reviewing separately obtained history, Documenting clinical information in the electronic or other health record, Independently interpreting results (not separately reported) and communicating results to the patient/family/caregiver, or Care coordination (not separately reported).     Each patient to whom he or she provides medical services by telemedicine is:  (1) informed of the relationship between the physician and patient and the respective role of any other health care provider with respect to management of the patient; and (2) notified that he or she may decline to receive medical services by telemedicine and may withdraw from such care at any time.      History of Present Illness   Ms. Christine presents for follow up of thyroid cancer and post operative hypothyroidism. Last visit with Dr. Jensen in 1/2023.      Has active history of postoperative hypothyroidism. Has history of thyroid cancer.      First noted to have thyroid nodules in 7/2022.      No family history of thyroid cancer or head/neck irradiation. No hypothyroidism or Hashimoto's prior to thyroid surgery.        Thyroid FNA dated 7/7/2022:     Abnormal   [facility] Thyroid Cytology Category: Malignant  Papillary thyroid  carcinoma.         S/p total thyroidectomy with central neck dissection on 7/21/2022:  Procedure: Thyroidectomy Tumor focality: multifocal Tumor site(s): Right and left lobes Tumor size(s): 5.0 x 2.8 x 1.7 cm; 2.5 x 2.5 mm Histologic type: Papillary, classic variant  Margins: -Distance of invasive carcinoma from closest margin: less than 1 mm (2.5 mm lesion); less than 1 mm (10.5mm lesion) Angioinvasion: Not identified Lymphatic invasion: Not identified Perineural invasion: Not identified Mitotic rate: Less than 1 per 2 mm2 Extrathyroidal extension: Not identified Regional lymph nodes: -Number of lymph nodes involved: 17, -Magda levels involved: VI Size of largest metastatic deposit: 10.5 mm Extranodal extension: Present, less than 1mm in extent Number of lymph nodes examined: 30 Magda levels examined: Level VI Pathologic stage classification: mpT3a N1a Ancillary studies: None     Also had right superior and left inferior parathyroid removed, and the left superior parathyroid was reimplanted.      After discussions with Karen Avila, Camila and Mily she decided against CR ablation due to concern for side effects, and risk/benefits given intermediate recurrence risk, undetectable Tg and no structural findings on neck ultrasound.      Post op thyroglobulin levels:    Latest Reference Range & Units 10/20/22 10:58 12/19/22 14:33 01/20/23 14:29 05/09/23 15:43   TSH 0.400 - 4.000 uIU/mL 0.472  0.472  0.472 0.290 (L) 0.552 0.046 (L)   Thyroglobulin Antibody Screen <1.8 IU/mL <1.8 <1.8 <1.8 <1.8   Thyroglobulin, Tumor Marker ng/mL <0.1 <0.1 <0.1 <0.1         Last neck ultrasound dated 1/25/2023:  The patient has had a thyroidectomy without residual or recurrent thyroid tissue seen in either thyroid bed.  Small lymph nodes are noted age side of the neck.  The largest on the right measures 2.4 x 0.7 cm.  The largest on the left measures 1 x 0.5 cm.  These have normal configuration.     Impression:  Status post thyroidectomy without evidence of residual thyroid tissue.  Small normal appearing lymph nodes in each side of the neck        For postoperative hypothyroidism currently taking levothyroxine 125 mcg of thyroid hormone.  Takes thyroid hormone on an empty stomach and separate from food  and other meds. Never misses thyroid hormone doses.    Since thyroidectomy she has noted overt fatigue and significant weight gain/difficulty losing weight despite a suppressed or low normal TSH level. She was tested for SYED and this came back normal. She does have a history of palpitations that pre-dated her thyroid disorder, but she has noticed an increase in palpitations since starting thyroid hormone. She's seen cardiology for this in the past and is on metoprolol.     Review of Systems   Constitutional:  Negative for chills and fever.   Gastrointestinal:  Negative for nausea.       Objective:   Physical Exam  Constitutional:       General: She is not in acute distress.     Appearance: Normal appearance. She is not ill-appearing.   Neurological:      Mental Status: She is alert.       BP Readings from Last 3 Encounters:   11/22/23 110/80   03/31/23 (!) 100/94   01/20/23 100/70     Wt Readings from Last 1 Encounters:   11/22/23 1604 88.1 kg (194 lb 3.6 oz)       There is no height or weight on file to calculate BMI.    Lab Review:   Lab Results   Component Value Date    HGBA1C 5.0 01/20/2023     Lab Results   Component Value Date    CHOL 183 01/20/2023    HDL 36 (L) 01/20/2023    LDLCALC 127.8 01/20/2023    TRIG 96 01/20/2023    CHOLHDL 19.7 (L) 01/20/2023     Lab Results   Component Value Date     11/22/2023    K 4.3 11/22/2023     11/22/2023    CO2 21 (L) 11/22/2023    GLU 80 11/22/2023    BUN 12 11/22/2023    CREATININE 0.8 11/22/2023    CALCIUM 9.0 11/22/2023    PROT 7.1 11/22/2023    ALBUMIN 3.9 11/22/2023    BILITOT 0.2 11/22/2023    ALKPHOS 60 11/22/2023    AST 14 11/22/2023    ALT 14 11/22/2023    ANIONGAP 13 11/22/2023    ESTGFRAFRICA >60 06/07/2022    EGFRNONAA >60 06/07/2022    TSH 0.047 (L) 11/22/2023         Assessment and Plan     Post-surgical hypothyroidism  --Patient with post-operative hypothyroidism  --Having some symptoms that could be related to hypothyroidism, however, these  "symptoms are non-specific and her TSH has been suppressed  --Reviewed options  --She would like to try combined synthetic therapy with Synthroid and liothyronine  --Will change to brand name Synthroid and decrease dose to 100 mcg  --Will start liothyronine 2.5 mcg BID  --TSH goal 0.1-0.5  --Repeat TSH in 5-6 weeks    Papillary thyroid carcinoma  --Patient s/p total thyroidectomy and central/upper mediastinal LN dissection in 7/2022  --With multifocal bilateral PTC, classic variant, largest foci in the right lobe at 5 cm, no ETE or lymphvasc invasion, 17/30 nodes positive in level VI, with NORMAN  --ТАТЬЯНА intermediate risk for recurrence, stage 1  --After discussions with Camila Reyes and Mily she decided against CR ablation due to concern for side effects, and risk/benefits given intermediate recurrence risk, undetectable Tg and no structural findings on neck ultrasound  --I think this is a reasonable approach  --Reviewed with her that if PTC recurs it generally recurs in the neck and repeat surgery may be indicated  --Currently with ТАТЬЯНА "excellent" response to therapy  --Will repeat thyroglobulin and ultrasound now  --TSH goal 0.1-0.5 at this time      TSH, thyroglobulin 5-6 weeks, ultrasound when able, follow up in 6 months      Olaf Treviño M.D. Staff Endocrinology     "

## 2024-02-08 ENCOUNTER — HOSPITAL ENCOUNTER (OUTPATIENT)
Dept: RADIOLOGY | Facility: HOSPITAL | Age: 29
Discharge: HOME | End: 2024-02-08
Attending: INTERNAL MEDICINE
Payer: MEDICARE

## 2024-02-08 DIAGNOSIS — E89.0 POST-SURGICAL HYPOTHYROIDISM: ICD-10-CM

## 2024-02-08 DIAGNOSIS — C73 PAPILLARY THYROID CARCINOMA: ICD-10-CM

## 2024-02-08 PROCEDURE — 76536 US EXAM OF HEAD AND NECK: CPT | Mod: TC

## 2024-02-08 PROCEDURE — 76536 US EXAM OF HEAD AND NECK: CPT | Mod: 26,,, | Performed by: RADIOLOGY

## 2024-02-23 ENCOUNTER — LAB VISIT (OUTPATIENT)
Dept: LAB | Facility: HOSPITAL | Age: 29
End: 2024-02-23
Attending: INTERNAL MEDICINE
Payer: MEDICARE

## 2024-02-23 DIAGNOSIS — E89.0 POST-SURGICAL HYPOTHYROIDISM: ICD-10-CM

## 2024-02-23 DIAGNOSIS — C73 PAPILLARY THYROID CARCINOMA: ICD-10-CM

## 2024-02-23 LAB
T4 FREE SERPL-MCNC: 1.09 NG/DL (ref 0.71–1.51)
TSH SERPL DL<=0.005 MIU/L-ACNC: 0.05 UIU/ML (ref 0.4–4)

## 2024-02-23 PROCEDURE — 84439 ASSAY OF FREE THYROXINE: CPT | Performed by: INTERNAL MEDICINE

## 2024-02-23 PROCEDURE — 84432 ASSAY OF THYROGLOBULIN: CPT | Performed by: INTERNAL MEDICINE

## 2024-02-23 PROCEDURE — 84443 ASSAY THYROID STIM HORMONE: CPT | Performed by: INTERNAL MEDICINE

## 2024-02-26 ENCOUNTER — PATIENT MESSAGE (OUTPATIENT)
Dept: ENDOCRINOLOGY | Facility: CLINIC | Age: 29
End: 2024-02-26
Payer: MEDICARE

## 2024-02-26 DIAGNOSIS — E89.0 POST-SURGICAL HYPOTHYROIDISM: Primary | ICD-10-CM

## 2024-02-26 LAB
THRYOGLOBULIN INTERPRETATION: ABNORMAL
THYROGLOB AB SERPL-ACNC: <1.8 IU/ML
THYROGLOB SERPL-MCNC: 0.1 NG/ML

## 2024-02-26 RX ORDER — LEVOTHYROXINE SODIUM 88 UG/1
88 TABLET ORAL
Qty: 30 TABLET | Refills: 11 | Status: SHIPPED | OUTPATIENT
Start: 2024-02-26 | End: 2024-04-29

## 2024-04-23 DIAGNOSIS — R10.9 ABDOMINAL PAIN, UNSPECIFIED ABDOMINAL LOCATION: ICD-10-CM

## 2024-04-23 NOTE — TELEPHONE ENCOUNTER
No care due was identified.  [facility] Embedded Care Due Messages. Reference number: [id number].   4/23/2024 10:37:33 AM CDT

## 2024-04-24 RX ORDER — PANTOPRAZOLE SODIUM 40 MG/1
40 TABLET, DELAYED RELEASE ORAL DAILY
Qty: 30 TABLET | Refills: 11 | Status: SHIPPED | OUTPATIENT
Start: 2024-04-24 | End: 2025-04-24

## 2024-04-26 ENCOUNTER — LAB VISIT (OUTPATIENT)
Dept: LAB | Facility: HOSPITAL | Age: 29
End: 2024-04-26
Attending: INTERNAL MEDICINE
Payer: MEDICARE

## 2024-04-26 DIAGNOSIS — E89.0 POST-SURGICAL HYPOTHYROIDISM: ICD-10-CM

## 2024-04-26 LAB — TSH SERPL DL<=0.005 MIU/L-ACNC: 0.12 UIU/ML (ref 0.4–4)

## 2024-04-26 PROCEDURE — 84443 ASSAY THYROID STIM HORMONE: CPT | Performed by: INTERNAL MEDICINE

## 2024-04-26 PROCEDURE — 84439 ASSAY OF FREE THYROXINE: CPT | Performed by: INTERNAL MEDICINE

## 2024-04-26 PROCEDURE — 36415 COLL VENOUS BLD VENIPUNCTURE: CPT | Mod: PO | Performed by: INTERNAL MEDICINE

## 2024-04-27 LAB — T4 FREE SERPL-MCNC: 1.24 NG/DL (ref 0.71–1.51)

## 2024-04-29 ENCOUNTER — PATIENT MESSAGE (OUTPATIENT)
Dept: ENDOCRINOLOGY | Facility: CLINIC | Age: 29
End: 2024-04-29
Payer: MEDICARE

## 2024-04-29 DIAGNOSIS — E89.0 POST-SURGICAL HYPOTHYROIDISM: Primary | ICD-10-CM

## 2024-04-29 DIAGNOSIS — C73 PAPILLARY THYROID CARCINOMA: ICD-10-CM

## 2024-04-29 RX ORDER — LEVOTHYROXINE SODIUM 100 UG/1
100 TABLET ORAL
Qty: 30 TABLET | Refills: 11 | Status: SHIPPED | OUTPATIENT
Start: 2024-04-29 | End: 2025-04-29

## 2024-06-21 ENCOUNTER — OFFICE VISIT (OUTPATIENT)
Dept: FAMILY MEDICINE | Facility: CLINIC | Age: 29
End: 2024-06-21
Payer: MEDICARE

## 2024-06-21 DIAGNOSIS — F32.1 CURRENT MODERATE EPISODE OF MAJOR DEPRESSIVE DISORDER WITHOUT PRIOR EPISODE: Primary | ICD-10-CM

## 2024-06-21 PROCEDURE — 1160F RVW MEDS BY RX/DR IN RCRD: CPT | Mod: CPTII,95,, | Performed by: STUDENT IN AN ORGANIZED HEALTH CARE EDUCATION/TRAINING PROGRAM

## 2024-06-21 PROCEDURE — G2211 COMPLEX E/M VISIT ADD ON: HCPCS | Mod: 95,,, | Performed by: STUDENT IN AN ORGANIZED HEALTH CARE EDUCATION/TRAINING PROGRAM

## 2024-06-21 PROCEDURE — 99214 OFFICE O/P EST MOD 30 MIN: CPT | Mod: 95,,, | Performed by: STUDENT IN AN ORGANIZED HEALTH CARE EDUCATION/TRAINING PROGRAM

## 2024-06-21 PROCEDURE — 1159F MED LIST DOCD IN RCRD: CPT | Mod: CPTII,95,, | Performed by: STUDENT IN AN ORGANIZED HEALTH CARE EDUCATION/TRAINING PROGRAM

## 2024-06-21 RX ORDER — ESCITALOPRAM OXALATE 10 MG/1
10 TABLET ORAL DAILY
Qty: 30 TABLET | Refills: 3 | Status: SHIPPED | OUTPATIENT
Start: 2024-06-21 | End: 2025-06-21

## 2024-06-21 NOTE — PATIENT INSTRUCTIONS
Migel Amin,     If you are due for any health screening(s) below please notify me so we can arrange them to be ordered and scheduled to maintain your health. Most healthy patients complete it. Don't lose out on improving your health.     All of your core healthy metrics are met.

## 2024-06-21 NOTE — PROGRESS NOTES
The patient location is: [place], [place]  The chief complaint leading to consultation is: fatigue    Visit type: audiovisual    Face to Face time with patient: 13 minutes  18 minutes of total time spent on the encounter, which includes face to face time and non-face to face time preparing to see the patient (eg, review of tests), Obtaining and/or reviewing separately obtained history, Documenting clinical information in the electronic or other health record, Independently interpreting results (not separately reported) and communicating results to the patient/family/caregiver, or Care coordination (not separately reported).         Each patient to whom he or she provides medical services by telemedicine is:  (1) informed of the relationship between the physician and patient and the respective role of any other health care provider with respect to management of the patient; and (2) notified that he or she may decline to receive medical services by telemedicine and may withdraw from such care at any time.    Notes:      [facility] MEDICINE CLINIC NOTE    Patient Name: Brennan Christine  YOB: 1995    PRESENTING HISTORY     History of Present Illness:  Ms. Brennan Christine is a 29 y.o. female     Seeing Dr. Treviño, Endocrine.   On syntrhoid. Doing better than levothyroxine generic.   C/o insomnia dn fatigue.   When does sleep doesn't feel like getting rest.   Also feeling anxiety/depression.   Onset since thyroid resection.     Depression- feels sad for no reason. No motivation.    Ex - friend came over- everything was a mess. Unlike her. Didn't even want to get out of bed. Works from home. Usually goes out, reads books. Not doing any of these things.     Very irritable  Anxiousness comes out of nowhere.   Feels overstimulated.     In past took amitryptyline for other indication, diazepam for plane ride.     Never hospitalized for depression.   No active or passive SI.      ROS      OBJECTIVE:   Vital Signs:  There were no vitals filed for this visit.       Physical Exam: Normal, no change.     Physical Exam    ASSESSMENT & PLAN:     Current moderate episode of major depressive disorder without prior episode  -     EScitalopram oxalate (LEXAPRO) 10 MG tablet; Take 1 tablet (10 mg total) by mouth once daily.  Dispense: 30 tablet; Refill: 3      Start above. 6 week f/u for reassessment. Will address and see how many of above symptoms are related.          Chetan Banuelos  Internal Medicine    Visit complexity inherent to evaluation and management associated with medical care services that serve as the continuing focal point for all needed health care services and/or with medical care services that are part of ongoing care related to a patient's single, serious condition or a complex condition provided today

## 2024-06-24 ENCOUNTER — PATIENT MESSAGE (OUTPATIENT)
Dept: FAMILY MEDICINE | Facility: CLINIC | Age: 29
End: 2024-06-24
Payer: MEDICARE

## 2024-08-02 ENCOUNTER — OFFICE VISIT (OUTPATIENT)
Dept: FAMILY MEDICINE | Facility: CLINIC | Age: 29
End: 2024-08-02
Payer: MEDICARE

## 2024-08-02 DIAGNOSIS — F32.1 CURRENT MODERATE EPISODE OF MAJOR DEPRESSIVE DISORDER WITHOUT PRIOR EPISODE: Primary | ICD-10-CM

## 2024-08-02 PROCEDURE — 1159F MED LIST DOCD IN RCRD: CPT | Mod: CPTII,95,, | Performed by: STUDENT IN AN ORGANIZED HEALTH CARE EDUCATION/TRAINING PROGRAM

## 2024-08-02 PROCEDURE — 99214 OFFICE O/P EST MOD 30 MIN: CPT | Mod: 95,,, | Performed by: STUDENT IN AN ORGANIZED HEALTH CARE EDUCATION/TRAINING PROGRAM

## 2024-08-02 PROCEDURE — G2211 COMPLEX E/M VISIT ADD ON: HCPCS | Mod: 95,,, | Performed by: STUDENT IN AN ORGANIZED HEALTH CARE EDUCATION/TRAINING PROGRAM

## 2024-08-02 PROCEDURE — 1160F RVW MEDS BY RX/DR IN RCRD: CPT | Mod: CPTII,95,, | Performed by: STUDENT IN AN ORGANIZED HEALTH CARE EDUCATION/TRAINING PROGRAM

## 2024-08-02 RX ORDER — TRAZODONE HYDROCHLORIDE 50 MG/1
50 TABLET ORAL NIGHTLY
Qty: 30 TABLET | Refills: 11 | Status: SHIPPED | OUTPATIENT
Start: 2024-08-02 | End: 2025-08-02

## 2024-08-28 ENCOUNTER — PATIENT MESSAGE (OUTPATIENT)
Dept: ENDOCRINOLOGY | Facility: CLINIC | Age: 29
End: 2024-08-28
Payer: MEDICARE

## 2024-08-28 DIAGNOSIS — E89.0 POST-SURGICAL HYPOTHYROIDISM: Primary | ICD-10-CM

## 2024-08-28 RX ORDER — LIOTHYRONINE SODIUM 5 UG/1
TABLET ORAL
Qty: 30 TABLET | Refills: 11 | Status: SHIPPED | OUTPATIENT
Start: 2024-08-28

## 2024-09-04 ENCOUNTER — PATIENT MESSAGE (OUTPATIENT)
Dept: FAMILY MEDICINE | Facility: CLINIC | Age: 29
End: 2024-09-04
Payer: MEDICARE

## 2024-09-16 ENCOUNTER — OFFICE VISIT (OUTPATIENT)
Dept: FAMILY MEDICINE | Facility: CLINIC | Age: 29
End: 2024-09-16
Payer: MEDICARE

## 2024-09-16 ENCOUNTER — LAB VISIT (OUTPATIENT)
Dept: LAB | Facility: HOSPITAL | Age: 29
End: 2024-09-16
Attending: STUDENT IN AN ORGANIZED HEALTH CARE EDUCATION/TRAINING PROGRAM
Payer: MEDICARE

## 2024-09-16 VITALS
WEIGHT: 189.13 LBS | SYSTOLIC BLOOD PRESSURE: 104 MMHG | HEART RATE: 84 BPM | DIASTOLIC BLOOD PRESSURE: 70 MMHG | OXYGEN SATURATION: 100 % | HEIGHT: 67 IN | BODY MASS INDEX: 29.69 KG/M2

## 2024-09-16 DIAGNOSIS — Z00.00 ROUTINE GENERAL MEDICAL EXAMINATION AT A HEALTH CARE FACILITY: ICD-10-CM

## 2024-09-16 DIAGNOSIS — R79.9 ABNORMAL FINDING OF BLOOD CHEMISTRY, UNSPECIFIED: ICD-10-CM

## 2024-09-16 DIAGNOSIS — G43.809 OTHER MIGRAINE WITHOUT STATUS MIGRAINOSUS, NOT INTRACTABLE: Primary | ICD-10-CM

## 2024-09-16 DIAGNOSIS — R94.120 ABNORMAL AUDITORY FUNCTION STUDY: ICD-10-CM

## 2024-09-16 DIAGNOSIS — R59.0 LOCALIZED ENLARGED LYMPH NODES: ICD-10-CM

## 2024-09-16 LAB
ALBUMIN SERPL BCP-MCNC: 4 G/DL (ref 3.5–5.2)
ALP SERPL-CCNC: 56 U/L (ref 55–135)
ALT SERPL W/O P-5'-P-CCNC: 19 U/L (ref 10–44)
ANION GAP SERPL CALC-SCNC: 11 MMOL/L (ref 8–16)
AST SERPL-CCNC: 19 U/L (ref 10–40)
BASOPHILS # BLD AUTO: 0.01 K/UL (ref 0–0.2)
BASOPHILS NFR BLD: 0.1 % (ref 0–1.9)
BILIRUB SERPL-MCNC: 0.4 MG/DL (ref 0.1–1)
BUN SERPL-MCNC: 13 MG/DL (ref 6–20)
CALCIUM SERPL-MCNC: 8.6 MG/DL (ref 8.7–10.5)
CHLORIDE SERPL-SCNC: 106 MMOL/L (ref 95–110)
CHOLEST SERPL-MCNC: 182 MG/DL (ref 120–199)
CHOLEST/HDLC SERPL: 4.2 {RATIO} (ref 2–5)
CO2 SERPL-SCNC: 23 MMOL/L (ref 23–29)
CREAT SERPL-MCNC: 0.7 MG/DL (ref 0.5–1.4)
DIFFERENTIAL METHOD BLD: ABNORMAL
EOSINOPHIL # BLD AUTO: 0.1 K/UL (ref 0–0.5)
EOSINOPHIL NFR BLD: 0.8 % (ref 0–8)
ERYTHROCYTE [DISTWIDTH] IN BLOOD BY AUTOMATED COUNT: 12.7 % (ref 11.5–14.5)
EST. GFR  (NO RACE VARIABLE): >60 ML/MIN/1.73 M^2
ESTIMATED AVG GLUCOSE: 97 MG/DL (ref 68–131)
GLUCOSE SERPL-MCNC: 83 MG/DL (ref 70–110)
HBA1C MFR BLD: 5 % (ref 4–5.6)
HCT VFR BLD AUTO: 39.2 % (ref 37–48.5)
HDLC SERPL-MCNC: 43 MG/DL (ref 40–75)
HDLC SERPL: 23.6 % (ref 20–50)
HGB BLD-MCNC: 13.5 G/DL (ref 12–16)
IMM GRANULOCYTES # BLD AUTO: 0.02 K/UL (ref 0–0.04)
IMM GRANULOCYTES NFR BLD AUTO: 0.3 % (ref 0–0.5)
LDLC SERPL CALC-MCNC: 117.8 MG/DL (ref 63–159)
LYMPHOCYTES # BLD AUTO: 2.5 K/UL (ref 1–4.8)
LYMPHOCYTES NFR BLD: 35.5 % (ref 18–48)
MCH RBC QN AUTO: 31.1 PG (ref 27–31)
MCHC RBC AUTO-ENTMCNC: 34.4 G/DL (ref 32–36)
MCV RBC AUTO: 90 FL (ref 82–98)
MONOCYTES # BLD AUTO: 0.5 K/UL (ref 0.3–1)
MONOCYTES NFR BLD: 6.5 % (ref 4–15)
NEUTROPHILS # BLD AUTO: 4 K/UL (ref 1.8–7.7)
NEUTROPHILS NFR BLD: 56.8 % (ref 38–73)
NONHDLC SERPL-MCNC: 139 MG/DL
NRBC BLD-RTO: 0 /100 WBC
PLATELET # BLD AUTO: 225 K/UL (ref 150–450)
PMV BLD AUTO: 10.8 FL (ref 9.2–12.9)
POTASSIUM SERPL-SCNC: 4.4 MMOL/L (ref 3.5–5.1)
PROT SERPL-MCNC: 6.9 G/DL (ref 6–8.4)
RBC # BLD AUTO: 4.34 M/UL (ref 4–5.4)
SODIUM SERPL-SCNC: 140 MMOL/L (ref 136–145)
TRIGL SERPL-MCNC: 106 MG/DL (ref 30–150)
WBC # BLD AUTO: 7.08 K/UL (ref 3.9–12.7)

## 2024-09-16 PROCEDURE — 3008F BODY MASS INDEX DOCD: CPT | Mod: CPTII,S$GLB,, | Performed by: STUDENT IN AN ORGANIZED HEALTH CARE EDUCATION/TRAINING PROGRAM

## 2024-09-16 PROCEDURE — 1160F RVW MEDS BY RX/DR IN RCRD: CPT | Mod: CPTII,S$GLB,, | Performed by: STUDENT IN AN ORGANIZED HEALTH CARE EDUCATION/TRAINING PROGRAM

## 2024-09-16 PROCEDURE — 1159F MED LIST DOCD IN RCRD: CPT | Mod: CPTII,S$GLB,, | Performed by: STUDENT IN AN ORGANIZED HEALTH CARE EDUCATION/TRAINING PROGRAM

## 2024-09-16 PROCEDURE — G2211 COMPLEX E/M VISIT ADD ON: HCPCS | Mod: S$GLB,,, | Performed by: STUDENT IN AN ORGANIZED HEALTH CARE EDUCATION/TRAINING PROGRAM

## 2024-09-16 PROCEDURE — 85025 COMPLETE CBC W/AUTO DIFF WBC: CPT | Performed by: STUDENT IN AN ORGANIZED HEALTH CARE EDUCATION/TRAINING PROGRAM

## 2024-09-16 PROCEDURE — 99214 OFFICE O/P EST MOD 30 MIN: CPT | Mod: S$GLB,,, | Performed by: STUDENT IN AN ORGANIZED HEALTH CARE EDUCATION/TRAINING PROGRAM

## 2024-09-16 PROCEDURE — 80061 LIPID PANEL: CPT | Performed by: STUDENT IN AN ORGANIZED HEALTH CARE EDUCATION/TRAINING PROGRAM

## 2024-09-16 PROCEDURE — 3074F SYST BP LT 130 MM HG: CPT | Mod: CPTII,S$GLB,, | Performed by: STUDENT IN AN ORGANIZED HEALTH CARE EDUCATION/TRAINING PROGRAM

## 2024-09-16 PROCEDURE — 99999 PR PBB SHADOW E&M-EST. PATIENT-LVL IV: CPT | Mod: PBBFAC,,, | Performed by: STUDENT IN AN ORGANIZED HEALTH CARE EDUCATION/TRAINING PROGRAM

## 2024-09-16 PROCEDURE — 80053 COMPREHEN METABOLIC PANEL: CPT | Performed by: STUDENT IN AN ORGANIZED HEALTH CARE EDUCATION/TRAINING PROGRAM

## 2024-09-16 PROCEDURE — 3078F DIAST BP <80 MM HG: CPT | Mod: CPTII,S$GLB,, | Performed by: STUDENT IN AN ORGANIZED HEALTH CARE EDUCATION/TRAINING PROGRAM

## 2024-09-16 PROCEDURE — 36415 COLL VENOUS BLD VENIPUNCTURE: CPT | Mod: PO | Performed by: STUDENT IN AN ORGANIZED HEALTH CARE EDUCATION/TRAINING PROGRAM

## 2024-09-16 PROCEDURE — 83036 HEMOGLOBIN GLYCOSYLATED A1C: CPT | Performed by: STUDENT IN AN ORGANIZED HEALTH CARE EDUCATION/TRAINING PROGRAM

## 2024-09-16 RX ORDER — AMITRIPTYLINE HYDROCHLORIDE 10 MG/1
10 TABLET, FILM COATED ORAL NIGHTLY PRN
Qty: 30 TABLET | Refills: 2 | Status: SHIPPED | OUTPATIENT
Start: 2024-09-16 | End: 2025-09-16

## 2024-09-16 RX ORDER — RIMEGEPANT SULFATE 75 MG/75MG
75 TABLET, ORALLY DISINTEGRATING ORAL DAILY PRN
Qty: 10 TABLET | Refills: 0 | Status: SHIPPED | OUTPATIENT
Start: 2024-09-16

## 2024-09-16 NOTE — PROGRESS NOTES
"[facility] MEDICINE CLINIC NOTE    Patient Name: Brennan Christine  YOB: 1995    PRESENTING HISTORY     History of Present Illness:  Ms. Brennan Christine is a 29 y.o. female here with insomnia.     Not sleeping.   When she does sleep it is excessive.     HAs- pounding.    A/w nausea.    Bilateral   Last all day   Wakes with them in morning.     Pain in left anterior neck.     Ringing in ears, mostly at night.   No hearing loss.     Feels vision is more blurry.         Review of Systems   HENT:  Positive for tinnitus. Negative for hearing loss.    Eyes:  Positive for blurred vision.   Neurological:  Positive for headaches.         OBJECTIVE:   Vital Signs:  Vitals:    09/16/24 1332   BP: 104/70   Pulse: 84   SpO2: 100%   Weight: 85.8 kg (189 lb 2.5 oz)   Height: 5' 7" (1.702 m)         Physical Exam  Vitals and nursing note reviewed.   Constitutional:       Appearance: She is not ill-appearing, toxic-appearing or diaphoretic.   HENT:      Head: Normocephalic and atraumatic.      Right Ear: External ear normal.      Left Ear: External ear normal.   Eyes:      General: No scleral icterus.     Conjunctiva/sclera: Conjunctivae normal.      Pupils: Pupils are equal, round, and reactive to light.   Neck:      Thyroid: No thyromegaly.   Cardiovascular:      Rate and Rhythm: Normal rate and regular rhythm.      Heart sounds: Normal heart sounds. No murmur heard.  Pulmonary:      Effort: Pulmonary effort is normal. No respiratory distress.      Breath sounds: Normal breath sounds. No wheezing or rales.   Musculoskeletal:         General: No tenderness or deformity. Normal range of motion.      Cervical back: Normal range of motion and neck supple.      Right lower leg: No edema.      Left lower leg: No edema.   Lymphadenopathy:      Cervical: No cervical adenopathy.   Skin:     General: Skin is warm and dry.      Findings: No erythema or rash.   Neurological:      General: No focal deficit " present.      Mental Status: She is alert and oriented to person, place, and time.      Cranial Nerves: No cranial nerve deficit.      Sensory: No sensory deficit.      Motor: No weakness.      Coordination: Coordination normal.      Gait: Gait is intact. Gait normal.      Deep Tendon Reflexes: Reflexes normal.   Psychiatric:         Mood and Affect: Mood and affect normal.         Cognition and Memory: Memory normal.         Judgment: Judgment normal.         ASSESSMENT & PLAN:     Other migraine without status migrainosus, not intractable  -     amitriptyline (ELAVIL) 10 MG tablet; Take 1 tablet (10 mg total) by mouth nightly as needed for Insomnia.  Dispense: 30 tablet; Refill: 2  -     rimegepant (NURTEC) 75 mg odt; Take 1 tablet (75 mg total) by mouth daily as needed for Migraine. Place ODT tablet on the tongue; alternatively the ODT tablet may be placed under the tongue  Dispense: 10 tablet; Refill: 0  -     CT Head Without Contrast; Future; Expected date: 09/16/2024    Localized enlarged lymph nodes  -     CT Soft Tissue Neck WO Contrast; Future; Expected date: 09/16/2024        Her HAs are most consistent with migraines.   Given insomnia issues, will start on elavil. If dose uptitration needed, will cross titrate off lexapro.   Nurtec prn (previously tried imitrex).  Given malignancy I think head imaging is indicated.     Hx thyroid malignancy, pain near the area in her neck. Get scan. Has upcoming thyroglobulin check.        Chetan Banuelos  Internal Medicine      Visit complexity inherent to evaluation and management associated with medical care services that serve as the continuing focal point for all needed health care services and/or with medical care services that are part of ongoing care related to a patient's single, serious condition or a complex condition provided today

## 2024-09-16 NOTE — PATIENT INSTRUCTIONS
Limit use of screens, especially before bed. No television, computer or phone 2 hours before bed.     Reduce or stop drinking caffeine, especially at night. This can worsen sleep.     No alcohol.     Bed is for sleeping. Don't get in bed and do other activities like watch tv, listen to music or anything else.     Don't keep a clock right by your bed. If you wake up and look at the clock it can be stimulating.     Keep room dark, quiet and cool. You will sleep better in a cool dark room.     If you wake up, get out of bed. Don't lay in bed awake. Do a relaxing activity without screens like reading. Try to minimize the number of lights you turn on.     Avoid exercise within 2 hours of bed time.     Try using meditation or relaxation techniques. Can try CBT-I thang (free thang available in usual thang stores) for ideas.

## 2024-09-18 ENCOUNTER — HOSPITAL ENCOUNTER (OUTPATIENT)
Dept: RADIOLOGY | Facility: HOSPITAL | Age: 29
Discharge: HOME | End: 2024-09-18
Attending: STUDENT IN AN ORGANIZED HEALTH CARE EDUCATION/TRAINING PROGRAM
Payer: MEDICARE

## 2024-09-18 ENCOUNTER — TELEPHONE (OUTPATIENT)
Dept: FAMILY MEDICINE | Facility: CLINIC | Age: 29
End: 2024-09-18

## 2024-09-18 ENCOUNTER — PATIENT MESSAGE (OUTPATIENT)
Dept: FAMILY MEDICINE | Facility: CLINIC | Age: 29
End: 2024-09-18

## 2024-09-18 ENCOUNTER — TELEPHONE (OUTPATIENT)
Dept: FAMILY MEDICINE | Facility: CLINIC | Age: 29
End: 2024-09-18
Payer: MEDICARE

## 2024-09-18 ENCOUNTER — E-VISIT (OUTPATIENT)
Dept: FAMILY MEDICINE | Facility: CLINIC | Age: 29
End: 2024-09-18
Payer: MEDICARE

## 2024-09-18 DIAGNOSIS — R09.1 PLEURISY: Primary | ICD-10-CM

## 2024-09-18 DIAGNOSIS — R09.1 PLEURISY: ICD-10-CM

## 2024-09-18 DIAGNOSIS — R07.9 CHEST PAIN, UNSPECIFIED TYPE: ICD-10-CM

## 2024-09-18 PROCEDURE — 71046 X-RAY EXAM CHEST 2 VIEWS: CPT | Mod: TC,FY,PO

## 2024-09-18 PROCEDURE — 71046 X-RAY EXAM CHEST 2 VIEWS: CPT | Mod: 26,,, | Performed by: STUDENT IN AN ORGANIZED HEALTH CARE EDUCATION/TRAINING PROGRAM

## 2024-09-18 RX ORDER — IBUPROFEN 600 MG/1
600 TABLET ORAL 3 TIMES DAILY
Qty: 42 TABLET | Refills: 0 | Status: SHIPPED | OUTPATIENT
Start: 2024-09-18 | End: 2024-10-02

## 2024-09-18 NOTE — TELEPHONE ENCOUNTER
Call placed to number indicated in the attached message below.  Spoke to patient who states she has been in communication with the office via My Chart and has performed an e-visit related to this matter.

## 2024-09-18 NOTE — TELEPHONE ENCOUNTER
Call routed from call center. Pt mother requesting CXR for possible pneumonia. Sts some discomfort with breathing. /60. Pulse 60. Denies chest pain or shortness of breath. E-visit sent via portal per Dr. Banuelos. Pt mother verbalized understanding

## 2024-09-18 NOTE — PROGRESS NOTES
Patient ID: Brennan Christine is a 29 y.o. female.    Chief Complaint: General Illness (Entered automatically based on patient selection in [employer].)    The patient initiated a request through [employer] on 9/18/2024 for evaluation and management with a chief complaint of General Illness (Entered automatically based on patient selection in [employer].)     I evaluated the questionnaire submission on 9/18/2024.    [facility] Peq Evisit [facility]group-Cough And Cold    9/18/2024 11:20 AM CDT - Filed by Patient   What do you need help with? Other Concern   Do you agree to participate in an E-Visit? Yes   If you have any of the following symptoms, please present to your local emergency room or call 911:  I acknowledge   Are you pregnant, could you be pregnant, or are you breast feeding? None of the above   What is the main issue you would like addressed today? Chest x ray   Please describe your symptoms Cannot take deep breath unless in a certain position   Where is your problem located? Left side   How severe are your symptoms? Severe   Have you had these symptoms before? Yes   How long have you been having these symptoms? For a few days   Please list any medications or treatments you have used for your condition and indicate if it was effective or not. Synthroid liothyronine lexapro   What makes this feel better? Aspirin helped/ sitting down bent over   What makes this feel worse? Sitting or standing up   Are these symptoms related to a condition that you currently have? No   Please describe any probable cause for these symptoms My sister had pneumonia and mom just had the flu   Provide any additional information you feel is important. na   Please attach any relevant images or files    Are you able to take your vital signs? Yes   Systolic Blood Pressure: 115   Diastolic Blood Pressure: 70   Weight: 186   Height: 5   Pulse: 60   Temperature: 97.3   Respiration rate:    Pulse Oxygen:          Encounter Diagnosis   Name Primary?     Pleurisy Yes        Orders Placed This Encounter   Procedures    X-Ray Chest PA And Lateral     Standing Status:   Future     Standing Expiration Date:   9/18/2025     Order Specific Question:   May the Radiologist modify the order per protocol to meet the clinical needs of the patient?     Answer:   Yes     Order Specific Question:   Release to patient     Answer:   Immediate    D-DIMER, QUANTITATIVE     Standing Status:   Future     Standing Expiration Date:   12/17/2025      Medications Ordered This Encounter   Medications    ibuprofen (ADVIL,MOTRIN) 600 MG tablet     Sig: Take 1 tablet (600 mg total) by mouth 3 (three) times daily. for 14 days     Dispense:  42 tablet     Refill:  0        No follow-ups on file.      Update day 2:   CXR clear  + D-dimer with hx of thyroid cancer and current neck pain in left anterior being evaluated by CT neck in 1 day, get CTA chest     E-Visit Time Tracking:    Day 1 Time (in minutes): 10  Day 2 Time (in minutes): 5    Total Time (in minutes): 15

## 2024-09-18 NOTE — TELEPHONE ENCOUNTER
Vijaya Mi [facility] Staff     ----- Message from Vijaya Mi sent at 9/18/2024 11:04 AM CDT -----  Type: Needs Medical Advice  Who Called:  pt mother  Best Call Back Number: [phone]    Additional Information: Pt mother is calling the office to see if chest xray can be put in pt chart for [place] location.Please call back and advise.

## 2024-09-19 ENCOUNTER — HOSPITAL ENCOUNTER (OUTPATIENT)
Dept: RADIOLOGY | Facility: HOSPITAL | Age: 29
Discharge: HOME | End: 2024-09-19
Attending: STUDENT IN AN ORGANIZED HEALTH CARE EDUCATION/TRAINING PROGRAM
Payer: MEDICARE

## 2024-09-19 ENCOUNTER — PATIENT MESSAGE (OUTPATIENT)
Dept: FAMILY MEDICINE | Facility: CLINIC | Age: 29
End: 2024-09-19
Payer: MEDICARE

## 2024-09-19 DIAGNOSIS — R07.9 CHEST PAIN, UNSPECIFIED TYPE: ICD-10-CM

## 2024-09-19 DIAGNOSIS — R09.1 PLEURISY: Primary | ICD-10-CM

## 2024-09-19 PROCEDURE — 25500020 PHARM REV CODE 255: Mod: PO | Performed by: STUDENT IN AN ORGANIZED HEALTH CARE EDUCATION/TRAINING PROGRAM

## 2024-09-19 PROCEDURE — 71275 CT ANGIOGRAPHY CHEST: CPT | Mod: 26,,, | Performed by: RADIOLOGY

## 2024-09-19 PROCEDURE — 71275 CT ANGIOGRAPHY CHEST: CPT | Mod: TC,PO

## 2024-09-19 RX ADMIN — IOHEXOL 86 ML: 350 INJECTION, SOLUTION INTRAVENOUS at 10:09

## 2024-09-20 ENCOUNTER — HOSPITAL ENCOUNTER (OUTPATIENT)
Dept: RADIOLOGY | Facility: HOSPITAL | Age: 29
Discharge: HOME | End: 2024-09-20
Attending: STUDENT IN AN ORGANIZED HEALTH CARE EDUCATION/TRAINING PROGRAM
Payer: MEDICARE

## 2024-09-20 DIAGNOSIS — R59.0 LOCALIZED ENLARGED LYMPH NODES: ICD-10-CM

## 2024-09-20 DIAGNOSIS — G43.809 OTHER MIGRAINE WITHOUT STATUS MIGRAINOSUS, NOT INTRACTABLE: ICD-10-CM

## 2024-09-20 PROCEDURE — 70450 CT HEAD/BRAIN W/O DYE: CPT | Mod: 26,,, | Performed by: RADIOLOGY

## 2024-09-20 PROCEDURE — 70450 CT HEAD/BRAIN W/O DYE: CPT | Mod: TC

## 2024-09-20 PROCEDURE — 70491 CT SOFT TISSUE NECK W/DYE: CPT | Mod: 26,,, | Performed by: RADIOLOGY

## 2024-09-20 PROCEDURE — 70491 CT SOFT TISSUE NECK W/DYE: CPT | Mod: TC

## 2024-09-20 PROCEDURE — 25500020 PHARM REV CODE 255

## 2024-09-20 RX ADMIN — IOHEXOL 75 ML: 350 INJECTION, SOLUTION INTRAVENOUS at 01:09

## 2024-09-21 RX ORDER — ALBUTEROL SULFATE 90 UG/1
2 INHALANT RESPIRATORY (INHALATION) EVERY 6 HOURS PRN
Qty: 18 G | Refills: 3 | Status: SHIPPED | OUTPATIENT
Start: 2024-09-21 | End: 2025-09-21

## 2024-10-03 ENCOUNTER — LAB VISIT (OUTPATIENT)
Dept: LAB | Facility: HOSPITAL | Age: 29
End: 2024-10-03
Attending: INTERNAL MEDICINE
Payer: MEDICARE

## 2024-10-03 DIAGNOSIS — E89.0 POST-SURGICAL HYPOTHYROIDISM: ICD-10-CM

## 2024-10-03 DIAGNOSIS — C73 PAPILLARY THYROID CARCINOMA: ICD-10-CM

## 2024-10-03 LAB
T4 FREE SERPL-MCNC: 1.18 NG/DL (ref 0.71–1.51)
TSH SERPL DL<=0.005 MIU/L-ACNC: 0.07 UIU/ML (ref 0.4–4)

## 2024-10-03 PROCEDURE — 36415 COLL VENOUS BLD VENIPUNCTURE: CPT | Mod: PO | Performed by: INTERNAL MEDICINE

## 2024-10-03 PROCEDURE — 86800 THYROGLOBULIN ANTIBODY: CPT | Performed by: INTERNAL MEDICINE

## 2024-10-03 PROCEDURE — 84443 ASSAY THYROID STIM HORMONE: CPT | Performed by: INTERNAL MEDICINE

## 2024-10-03 PROCEDURE — 84432 ASSAY OF THYROGLOBULIN: CPT | Performed by: INTERNAL MEDICINE

## 2024-10-03 PROCEDURE — 84439 ASSAY OF FREE THYROXINE: CPT | Performed by: INTERNAL MEDICINE

## 2024-10-05 LAB
THRYOGLOBULIN INTERPRETATION: ABNORMAL
THYROGLOB AB SERPL-ACNC: <1.8 IU/ML
THYROGLOB SERPL-MCNC: 0.2 NG/ML

## 2024-10-10 ENCOUNTER — PATIENT MESSAGE (OUTPATIENT)
Dept: ENDOCRINOLOGY | Facility: CLINIC | Age: 29
End: 2024-10-10

## 2024-10-10 ENCOUNTER — OFFICE VISIT (OUTPATIENT)
Dept: ENDOCRINOLOGY | Facility: CLINIC | Age: 29
End: 2024-10-10
Payer: MEDICARE

## 2024-10-10 DIAGNOSIS — E89.0 POST-SURGICAL HYPOTHYROIDISM: Primary | ICD-10-CM

## 2024-10-10 DIAGNOSIS — C73 PAPILLARY THYROID CARCINOMA: ICD-10-CM

## 2024-10-10 PROCEDURE — 1159F MED LIST DOCD IN RCRD: CPT | Mod: CPTII,95,, | Performed by: INTERNAL MEDICINE

## 2024-10-10 PROCEDURE — 99214 OFFICE O/P EST MOD 30 MIN: CPT | Mod: 95,,, | Performed by: INTERNAL MEDICINE

## 2024-10-10 PROCEDURE — G2211 COMPLEX E/M VISIT ADD ON: HCPCS | Mod: 95,,, | Performed by: INTERNAL MEDICINE

## 2024-10-10 PROCEDURE — 1160F RVW MEDS BY RX/DR IN RCRD: CPT | Mod: CPTII,95,, | Performed by: INTERNAL MEDICINE

## 2024-10-10 PROCEDURE — 3044F HG A1C LEVEL LT 7.0%: CPT | Mod: CPTII,95,, | Performed by: INTERNAL MEDICINE

## 2024-10-10 RX ORDER — LEVOTHYROXINE SODIUM 100 UG/1
TABLET ORAL
Qty: 30 TABLET | Refills: 11 | Status: SHIPPED | OUTPATIENT
Start: 2024-10-10

## 2024-10-10 NOTE — ASSESSMENT & PLAN NOTE
--Patient s/p total thyroidectomy and central/upper mediastinal LN dissection in 7/2022  --With multifocal bilateral PTC, classic variant, largest foci in the right lobe at 5 cm, no ETE or lymphvasc invasion, 17/30 nodes positive in level VI, with NORMAN  --ТАТЬЯНА intermediate risk for recurrence, stage 1  --After discussions with Camila Reyes and Mily she decided against CR ablation due to concern for side effects, and risk/benefits given intermediate recurrence risk, undetectable Tg and no structural findings on neck ultrasound  --I think this is a reasonable approach  --However, her Tg is now slightly detectable at 0.2  --Reviewed with her that if PTC recurs it generally recurs in the neck  --Will get an ultrasound in our department now to see if she has any small findings that could explain the mild Tg elevation  --Reviewed with her that the Tg could be slightly detectable due to thyroid tissue in the neck that is microscopic and may not show up on any imaging modalities at this point  --Reviewed with her that CR therapy would certainly be an option if her Tg rises further  --Ultrasound in our department now  --Tg in 6 months  --TSH goal 0.1-0.5 at this time

## 2024-10-10 NOTE — ASSESSMENT & PLAN NOTE
--Patient with post-operative hypothyroidism  --Overall feels better on combination T4/T3 therapy  --TSH is slightly below goal  --Change Synthroid to 100 mcg 6 days per week with a half-tablet (50 mcg) on 1 day per week  --Continue liothyronine 5 mcg once daily  --TSH goal 0.1-0.5  --Repeat TSH in 5-6 weeks

## 2024-10-10 NOTE — PROGRESS NOTES
Subjective:      Patient ID: Brennan Christine is a 29 y.o. female.    Chief Complaint:  Hypothyroidism    The patient location is: Home  The chief complaint leading to consultation is: Hypothyroidism, history of thyroid cancer    Visit type: audiovisual    Face to Face time with patient: 15 min  25 minutes of total time spent on the encounter, which includes face to face time and non-face to face time preparing to see the patient (eg, review of tests), Obtaining and/or reviewing separately obtained history, Documenting clinical information in the electronic or other health record, Independently interpreting results (not separately reported) and communicating results to the patient/family/caregiver, or Care coordination (not separately reported).     Each patient to whom he or she provides medical services by telemedicine is:  (1) informed of the relationship between the physician and patient and the respective role of any other health care provider with respect to management of the patient; and (2) notified that he or she may decline to receive medical services by telemedicine and may withdraw from such care at any time.    History of Present Illness  Ms. Christine presents for follow up of thyroid cancer and post operative hypothyroidism. Last visit in 1/2024.      Has active history of postoperative hypothyroidism. Has history of thyroid cancer.      First noted to have thyroid nodules in 7/2022.      No family history of thyroid cancer or head/neck irradiation. No hypothyroidism or Hashimoto's prior to thyroid surgery.        Thyroid FNA dated 7/7/2022:     Abnormal   [facility] Thyroid Cytology Category: Malignant  Papillary thyroid  carcinoma.         S/p total thyroidectomy with central neck dissection on 7/21/2022:  Procedure: Thyroidectomy Tumor focality: multifocal Tumor site(s): Right and left lobes Tumor size(s): 5.0 x 2.8 x 1.7 cm; 2.5 x 2.5 mm Histologic type: Papillary, classic variant Margins:  -Distance of invasive carcinoma from closest margin: less than 1 mm (2.5 mm lesion); less than 1 mm (10.5mm lesion) Angioinvasion: Not identified Lymphatic invasion: Not identified Perineural invasion: Not identified Mitotic rate: Less than 1 per 2 mm2 Extrathyroidal extension: Not identified Regional lymph nodes: -Number of lymph nodes involved: 17, -Magda levels involved: VI Size of largest metastatic deposit: 10.5 mm Extranodal extension: Present, less than 1mm in extent Number of lymph nodes examined: 30 Magda levels examined: Level VI Pathologic stage classification: mpT3a N1a Ancillary studies: None     Also had right superior and left inferior parathyroid removed, and the left superior parathyroid was reimplanted.      After discussions with Karen Avila, Camila and Mily she decided against CR ablation due to concern for side effects, and risk/benefits given intermediate recurrence risk, undetectable Tg and no structural findings on neck ultrasound.      Post op thyroglobulin levels:   Latest Reference Range & Units 12/19/22 14:33 01/20/23 14:29 05/09/23 15:43 11/22/23 16:40 02/23/24 15:37 04/26/24 15:37 10/03/24 10:00   TSH 0.400 - 4.000 uIU/mL 0.290 (L) 0.552 0.046 (L) 0.047 (L) 0.047 (L) 0.117 (L) 0.074 (L)   Thyroglobulin Antibody Screen <1.8 IU/mL <1.8 <1.8 <1.8  <1.8  <1.8   Thyroglobulin, Tumor Marker ng/mL <0.1 <0.1 <0.1  0.1 (H)  0.2 (H)        Last neck ultrasound dated 2/2024:  There is no tissue along either thyroid bed.  There are a few normal sized lymph nodes along the right neck at the R3 level with short axis diameters each 2 mm.  There are a few normal sized nodes along the left neck at the L2 level with short axis diameters ranging 0.4-0.7 cm.     Impression:     Prior thyroidectomy with no cervical adenopathy.      Had CT of the neck and chest in 9/2024:  1. Physical postsurgical changes of total thyroidectomy.  No evidence of significant residual or recurrent thyroid tissue.  2. No  pathologically enlarged or abnormally enhancing cervical lymph nodes.        For postoperative hypothyroidism currently taking Synthroid 100 mcg daily and liothyronine 5 mcg daily.  Takes thyroid hormone on an empty stomach and separate from food and other meds. Never misses thyroid hormone doses.    Overall she feels better on combination T4/T3 therapy. She denies any palpitations or tremor.    Review of Systems   Constitutional:  Negative for chills and fever.   Gastrointestinal:  Negative for nausea.       Objective:   Physical Exam  Constitutional:       General: She is not in acute distress.     Appearance: Normal appearance. She is not ill-appearing.   Neurological:      Mental Status: She is alert.       BP Readings from Last 3 Encounters:   09/16/24 104/70   11/22/23 110/80   03/31/23 (!) 100/94     Wt Readings from Last 1 Encounters:   09/16/24 1332 85.8 kg (189 lb 2.5 oz)       There is no height or weight on file to calculate BMI.    Lab Review:   Lab Results   Component Value Date    HGBA1C 5.0 09/16/2024     Lab Results   Component Value Date    CHOL 182 09/16/2024    HDL 43 09/16/2024    LDLCALC 117.8 09/16/2024    TRIG 106 09/16/2024    CHOLHDL 23.6 09/16/2024     Lab Results   Component Value Date     09/16/2024    K 4.4 09/16/2024     09/16/2024    CO2 23 09/16/2024    GLU 83 09/16/2024    BUN 13 09/16/2024    CREATININE 0.7 09/16/2024    CALCIUM 8.6 (L) 09/16/2024    PROT 6.9 09/16/2024    ALBUMIN 4.0 09/16/2024    BILITOT 0.4 09/16/2024    ALKPHOS 56 09/16/2024    AST 19 09/16/2024    ALT 19 09/16/2024    ANIONGAP 11 09/16/2024    ESTGFRAFRICA >60 06/07/2022    EGFRNONAA >60 06/07/2022    TSH 0.074 (L) 10/03/2024         Assessment and Plan     Post-surgical hypothyroidism  --Patient with post-operative hypothyroidism  --Overall feels better on combination T4/T3 therapy  --TSH is slightly below goal  --Change Synthroid to 100 mcg 6 days per week with a half-tablet (50 mcg) on 1 day per  week  --Continue liothyronine 5 mcg once daily  --TSH goal 0.1-0.5  --Repeat TSH in 5-6 weeks    Papillary thyroid carcinoma  --Patient s/p total thyroidectomy and central/upper mediastinal LN dissection in 7/2022  --With multifocal bilateral PTC, classic variant, largest foci in the right lobe at 5 cm, no ETE or lymphvasc invasion, 17/30 nodes positive in level VI, with NORMAN  --ТАТЬЯНА intermediate risk for recurrence, stage 1  --After discussions with Camila Reyes and Mily she decided against CR ablation due to concern for side effects, and risk/benefits given intermediate recurrence risk, undetectable Tg and no structural findings on neck ultrasound  --I think this is a reasonable approach  --However, her Tg is now slightly detectable at 0.2  --Reviewed with her that if PTC recurs it generally recurs in the neck  --Will get an ultrasound in our department now to see if she has any small findings that could explain the mild Tg elevation  --Reviewed with her that the Tg could be slightly detectable due to thyroid tissue in the neck that is microscopic and may not show up on any imaging modalities at this point  --Reviewed with her that CR therapy would certainly be an option if her Tg rises further  --Ultrasound in our department now  --Tg in 6 months  --TSH goal 0.1-0.5 at this time      Ultrasound now, TSH in 6 weeks, follow up in 6 months      Visit today included increased complexity associated with the care of the episodic problem hypothyroidism, history of thyroid cancer addressed and managing the longitudinal care of the patient due to the serious and/or complex managed problem(s).       Olaf Treviño M.D. Staff Endocrinology

## 2024-10-16 DIAGNOSIS — F32.1 CURRENT MODERATE EPISODE OF MAJOR DEPRESSIVE DISORDER WITHOUT PRIOR EPISODE: ICD-10-CM

## 2024-10-16 RX ORDER — ESCITALOPRAM OXALATE 10 MG/1
10 TABLET ORAL
Qty: 90 TABLET | Refills: 3 | Status: SHIPPED | OUTPATIENT
Start: 2024-10-16

## 2024-10-16 NOTE — TELEPHONE ENCOUNTER
No care due was identified.  [facility] Embedded Care Due Messages. Reference number: [id number].   10/16/2024 4:01:29 AM CDT

## 2024-10-16 NOTE — TELEPHONE ENCOUNTER
Refill Decision Note   Brennan Christine  is requesting a refill authorization.  Brief Assessment and Rationale for Refill:  Approve     Medication Therapy Plan:         Comments:     Note composed:12:39 PM 10/16/2024

## 2024-11-14 ENCOUNTER — PATIENT MESSAGE (OUTPATIENT)
Dept: ENDOCRINOLOGY | Facility: CLINIC | Age: 29
End: 2024-11-14
Payer: MEDICARE

## 2024-11-21 ENCOUNTER — TELEPHONE (OUTPATIENT)
Dept: FAMILY MEDICINE | Facility: CLINIC | Age: 29
End: 2024-11-21
Payer: MEDICARE

## 2024-11-21 ENCOUNTER — LAB VISIT (OUTPATIENT)
Dept: LAB | Facility: HOSPITAL | Age: 29
End: 2024-11-21
Attending: INTERNAL MEDICINE
Payer: MEDICARE

## 2024-11-21 DIAGNOSIS — E89.0 POST-SURGICAL HYPOTHYROIDISM: ICD-10-CM

## 2024-11-21 LAB — TSH SERPL DL<=0.005 MIU/L-ACNC: 0.36 UIU/ML (ref 0.4–4)

## 2024-11-21 PROCEDURE — 84443 ASSAY THYROID STIM HORMONE: CPT | Performed by: INTERNAL MEDICINE

## 2024-11-21 PROCEDURE — 84439 ASSAY OF FREE THYROXINE: CPT | Performed by: INTERNAL MEDICINE

## 2024-11-21 PROCEDURE — 36415 COLL VENOUS BLD VENIPUNCTURE: CPT | Mod: PO | Performed by: INTERNAL MEDICINE

## 2024-11-22 LAB — T4 FREE SERPL-MCNC: 1 NG/DL (ref 0.71–1.51)

## 2024-11-22 NOTE — TELEPHONE ENCOUNTER
Call placed to patient for notification. Patient verbalized understanding.  Also forwarded exert to patient via [facility] for review.   
Elaine Varner Staff     ----- Message from Elaine sent at 11/21/2024  2:15 PM CST -----  Contact: pt  Type: Needs Medical Advice         Who Called: pt  Best Call Back Number:[phone]  Additional Information: Requesting a call back regarding  Pt is asking if semaglutide would be safe for her to take. She was given it by her Endo provider  Dr Posada. Pt is asking for office to call her please   Please Advise- Thank you  
Patient reports receiving prescription for Semaglutide (Ozempic) from her endocrinology provider.  Patient has history of Papillary Thyroid Cancer and would like to confirm if it is safe for her to take this medication.  Patient states endocrinologist also advised Mounjaro would be another option for treatment.  Please advise. Thank you.    
Should be ok. Endocrinologist should monitor closely. Excerpt from an article I found when doing research on this.       
PRINCIPAL DISCHARGE DIAGNOSIS  Diagnosis: SAH (subarachnoid hemorrhage)  Assessment and Plan of Treatment: treated with EVD, cerebral angiogram s/p WEB, monitored in ICU steeting, will need to complete full 21day medical regimen for SAH

## 2024-12-12 DIAGNOSIS — G43.809 OTHER MIGRAINE WITHOUT STATUS MIGRAINOSUS, NOT INTRACTABLE: ICD-10-CM

## 2024-12-12 NOTE — TELEPHONE ENCOUNTER
Refill Routing Note   Medication(s) are not appropriate for processing by [facility] for the following reason(s):        New or recently adjusted medication    [facility] action(s):  Defer               Appointments  past 12m or future 3m with PCP    Date Provider   Last Visit   9/18/2024 Chetan Banuelos MD   Next Visit   Visit date not found Chetan Banuelos MD   ED visits in past 90 days: 0        Note composed:8:27 AM 12/12/2024

## 2024-12-12 NOTE — TELEPHONE ENCOUNTER
No care due was identified.  [facility] Embedded Care Due Messages. Reference number: [id number].   12/12/2024 4:01:30 AM CST

## 2024-12-13 RX ORDER — AMITRIPTYLINE HYDROCHLORIDE 10 MG/1
10 TABLET, FILM COATED ORAL NIGHTLY PRN
Qty: 90 TABLET | Refills: 4 | Status: SHIPPED | OUTPATIENT
Start: 2024-12-13

## 2025-02-19 RX ORDER — LIOTHYRONINE SODIUM 5 UG/1
TABLET ORAL
Qty: 30 TABLET | Refills: 5 | Status: SHIPPED | OUTPATIENT
Start: 2025-02-19

## 2025-03-26 DIAGNOSIS — R10.9 ABDOMINAL PAIN, UNSPECIFIED ABDOMINAL LOCATION: ICD-10-CM

## 2025-03-27 RX ORDER — PANTOPRAZOLE SODIUM 40 MG/1
40 TABLET, DELAYED RELEASE ORAL
Qty: 90 TABLET | Refills: 1 | Status: SHIPPED | OUTPATIENT
Start: 2025-03-27

## 2025-03-27 NOTE — TELEPHONE ENCOUNTER
No care due was identified.  [facility] Embedded Care Due Messages. Reference number: [id number].   3/26/2025 7:29:13 PM CDT

## 2025-03-27 NOTE — TELEPHONE ENCOUNTER
Refill Decision Note   Cinthiamery Christine  is requesting a refill authorization.  Brief Assessment and Rationale for Refill:  Approve     Medication Therapy Plan:         Comments:     Note composed:8:33 AM 03/27/2025

## 2025-04-03 ENCOUNTER — HOSPITAL ENCOUNTER (OUTPATIENT)
Dept: ENDOCRINOLOGY | Facility: CLINIC | Age: 30
Discharge: HOME | End: 2025-04-03
Attending: INTERNAL MEDICINE
Payer: COMMERCIAL

## 2025-04-03 ENCOUNTER — LAB VISIT (OUTPATIENT)
Dept: LAB | Facility: HOSPITAL | Age: 30
End: 2025-04-03
Attending: INTERNAL MEDICINE
Payer: COMMERCIAL

## 2025-04-03 ENCOUNTER — RESULTS FOLLOW-UP (OUTPATIENT)
Dept: ENDOCRINOLOGY | Facility: CLINIC | Age: 30
End: 2025-04-03

## 2025-04-03 DIAGNOSIS — C73 PAPILLARY THYROID CARCINOMA: ICD-10-CM

## 2025-04-03 DIAGNOSIS — E89.0 POST-SURGICAL HYPOTHYROIDISM: Primary | ICD-10-CM

## 2025-04-03 DIAGNOSIS — E89.0 POST-SURGICAL HYPOTHYROIDISM: ICD-10-CM

## 2025-04-03 LAB
T4 FREE SERPL-MCNC: 1.29 NG/DL (ref 0.71–1.51)
TSH SERPL-ACNC: 0.24 UIU/ML (ref 0.4–4)

## 2025-04-03 PROCEDURE — 84443 ASSAY THYROID STIM HORMONE: CPT

## 2025-04-03 PROCEDURE — 84432 ASSAY OF THYROGLOBULIN: CPT

## 2025-04-03 PROCEDURE — 76536 US EXAM OF HEAD AND NECK: CPT | Mod: S$GLB,,, | Performed by: INTERNAL MEDICINE

## 2025-04-03 PROCEDURE — 36415 COLL VENOUS BLD VENIPUNCTURE: CPT

## 2025-04-03 PROCEDURE — 84439 ASSAY OF FREE THYROXINE: CPT

## 2025-04-05 LAB
ENDOCRINOLOGIST REVIEW: NORMAL
THYROGLOB AB SERPL IA-ACNC: <1.8 IU/ML
THYROGLOB SERPL-MCNC: 0.3 NG/ML

## 2025-04-10 ENCOUNTER — OFFICE VISIT (OUTPATIENT)
Dept: ENDOCRINOLOGY | Facility: CLINIC | Age: 30
End: 2025-04-10
Payer: COMMERCIAL

## 2025-04-10 DIAGNOSIS — E66.9 OBESITY (BMI 30-39.9): ICD-10-CM

## 2025-04-10 DIAGNOSIS — E89.0 POST-SURGICAL HYPOTHYROIDISM: Primary | ICD-10-CM

## 2025-04-10 DIAGNOSIS — C73 PAPILLARY THYROID CARCINOMA: ICD-10-CM

## 2025-04-10 RX ORDER — LIOTHYRONINE SODIUM 5 UG/1
TABLET ORAL
Qty: 30 TABLET | Refills: 5 | Status: SHIPPED | OUTPATIENT
Start: 2025-04-10

## 2025-04-10 RX ORDER — LEVOTHYROXINE SODIUM 100 UG/1
TABLET ORAL
Qty: 30 TABLET | Refills: 11 | Status: SHIPPED | OUTPATIENT
Start: 2025-04-10

## 2025-04-10 RX ORDER — TIRZEPATIDE 7.5 MG/.5ML
7.5 INJECTION, SOLUTION SUBCUTANEOUS
Qty: 4 PEN | Refills: 11 | Status: SHIPPED | OUTPATIENT
Start: 2025-04-10

## 2025-04-10 NOTE — PROGRESS NOTES
Subjective:      Patient ID: Brennan Christine is a 30 y.o. female.    Chief Complaint:  Hypothyroidism    The patient location is: Home  The chief complaint leading to consultation is: Hypothyroidism    Visit type: audiovisual    Face to Face time with patient: 15 min  30 minutes of total time spent on the encounter, which includes face to face time and non-face to face time preparing to see the patient (eg, review of tests), Obtaining and/or reviewing separately obtained history, Documenting clinical information in the electronic or other health record, Independently interpreting results (not separately reported) and communicating results to the patient/family/caregiver, or Care coordination (not separately reported).     Each patient to whom he or she provides medical services by telemedicine is:  (1) informed of the relationship between the physician and patient and the respective role of any other health care provider with respect to management of the patient; and (2) notified that he or she may decline to receive medical services by telemedicine and may withdraw from such care at any time.    History of Present Illness  Ms. Christine presents for follow up of thyroid cancer and post operative hypothyroidism. Last visit in 10/2024.     Has active history of postoperative hypothyroidism. Has history of thyroid cancer.      First noted to have thyroid nodules in 7/2022.      No family history of thyroid cancer or head/neck irradiation. No hypothyroidism or Hashimoto's prior to thyroid surgery.        Thyroid FNA dated 7/7/2022:     Abnormal   [facility] Thyroid Cytology Category: Malignant  Papillary thyroid  carcinoma.         S/p total thyroidectomy with central neck dissection on 7/21/2022:  Procedure: Thyroidectomy Tumor focality: multifocal Tumor site(s): Right and left lobes Tumor size(s): 5.0 x 2.8 x 1.7 cm; 2.5 x 2.5 mm Histologic type: Papillary, classic variant Margins: -Distance of invasive  carcinoma from closest margin: less than 1 mm (2.5 mm lesion); less than 1 mm (10.5mm lesion) Angioinvasion: Not identified Lymphatic invasion: Not identified Perineural invasion: Not identified Mitotic rate: Less than 1 per 2 mm2 Extrathyroidal extension: Not identified Regional lymph nodes: -Number of lymph nodes involved: 17, -Magda levels involved: VI Size of largest metastatic deposit: 10.5 mm Extranodal extension: Present, less than 1mm in extent Number of lymph nodes examined: 30 Magda levels examined: Level VI Pathologic stage classification: mpT3a N1a Ancillary studies: None     Also had right superior and left inferior parathyroid removed, and the left superior parathyroid was reimplanted.      After discussions with Camila Reyes and Mily she decided against CR ablation due to concern for side effects, and risk/benefits given intermediate recurrence risk, undetectable Tg and no structural findings on neck ultrasound.      Post op thyroglobulin levels:   Latest Reference Range & Units 01/20/23 14:29 05/09/23 15:43 11/22/23 16:40 02/23/24 15:37 04/26/24 15:37 10/03/24 10:00 11/21/24 14:35 04/03/25 12:05   TSH 0.400 - 4.000 uIU/mL 0.552 0.046 (L) 0.047 (L) 0.047 (L) 0.117 (L) 0.074 (L) 0.361 (L) 0.245 (L)   Thyroglobulin, Tumor Marker ng/mL <0.1 <0.1  0.1 (H)  0.2 (H)     Thyroglobulin Ab Screen 0.0 - 3.9 IU/mL <4.0 <4.0         Thyroglobulin, Tumor Marker, S < or = 33 ng/mL        0.3     Last neck ultrasound dated 4/2024:  Impression:  Status post thyroidectomy.     No sonographic evidence of malignancy in this patient with a minimally detectable serum thyroglobulin level.     Recommend repeat neck ultrasound in 1 year.        Had CT of the neck and chest in 9/2024:  1. Physical postsurgical changes of total thyroidectomy.  No evidence of significant residual or recurrent thyroid tissue.  2. No pathologically enlarged or abnormally enhancing cervical lymph nodes.        For postoperative  hypothyroidism currently taking Synthroid 100 mcg 6 days per week with 50 mcg one day per week, and liothyronine 2.5 mcg PO BID.   Takes thyroid hormone on an empty stomach and separate from food and other meds. Never misses thyroid hormone doses.     Overall she feels better on combination T4/T3 therapy. She denies any palpitations or tremor.      She has been taking tirzepatide for obesity since 11/2024 and has successfully lost 30 lbs.     Review of Systems   Constitutional:  Negative for chills and fever.   Gastrointestinal:  Negative for nausea.       Objective:   Physical Exam  Constitutional:       General: She is not in acute distress.     Appearance: Normal appearance. She is not ill-appearing.   Neurological:      Mental Status: She is alert.       BP Readings from Last 3 Encounters:   09/16/24 104/70   11/22/23 110/80   03/31/23 (!) 100/94     Wt Readings from Last 1 Encounters:   09/16/24 1332 85.8 kg (189 lb 2.5 oz)       There is no height or weight on file to calculate BMI.    Lab Review:   Lab Results   Component Value Date    HGBA1C 5.0 09/16/2024     Lab Results   Component Value Date    CHOL 182 09/16/2024    HDL 43 09/16/2024    LDLCALC 117.8 09/16/2024    TRIG 106 09/16/2024    CHOLHDL 23.6 09/16/2024     Lab Results   Component Value Date     09/16/2024    K 4.4 09/16/2024     09/16/2024    CO2 23 09/16/2024    GLU 83 09/16/2024    BUN 13 09/16/2024    CREATININE 0.7 09/16/2024    CALCIUM 8.6 (L) 09/16/2024    PROT 6.9 09/16/2024    ALBUMIN 4.0 09/16/2024    BILITOT 0.4 09/16/2024    ALKPHOS 56 09/16/2024    AST 19 09/16/2024    ALT 19 09/16/2024    ANIONGAP 11 09/16/2024    ESTGFRAFRICA >60 06/07/2022    EGFRNONAA >60 06/07/2022    TSH 0.245 (L) 04/03/2025         Assessment and Plan     Post-surgical hypothyroidism  --Patient with post-operative hypothyroidism  --Overall feels better on combination T4/T3 therapy  --TSH is at goal  --Continue Synthroid at100 mcg 6 days per week  with a half-tablet (50 mcg) on 1 day per week  --Continue liothyronine 5 mcg once daily  --TSH goal 0.1-0.5  --Repeat TSH in 6 months    Papillary thyroid carcinoma  --Patient s/p total thyroidectomy and central/upper mediastinal LN dissection in 7/2022  --With multifocal bilateral PTC, classic variant, largest foci in the right lobe at 5 cm, no ETE or lymphvasc invasion, 17/30 nodes positive in level VI, with NORMAN  --ТАТЬЯНА intermediate risk for recurrence, stage 1  --After discussions with Camila Reyes and Mily she decided against CR ablation due to concern for side effects, and risk/benefits given intermediate recurrence risk, undetectable Tg and no structural findings on neck ultrasound  --I think this is a reasonable approach  --However, her Tg is now slightly detectable at 0.3 (will need to monitor to see if this continues to trend up)  --Reviewed with her that if PTC recurs it generally recurs in the neck  --Neck ultrasound has been negative  --Reviewed with her that the Tg could be slightly detectable due to thyroid tissue in the neck that is microscopic and may not show up on any imaging modalities at this point  --Reviewed with her that CR therapy would certainly be an option if her Tg rises further  --Tg in 6 months  --TSH goal 0.1-0.5 at this time  --Repeat neck ultrasound in 6 months    Obesity (BMI 30-39.9)  --Has been taking tirzepatide 7.5 mg and has lost nearly 30 lbs since 11/2024  --Will refill tirzepatide at 7.5 mg    Visit today included increased complexity associated with the care of the episodic problem hypothyroidism, thyroid cancer addressed and managing the longitudinal care of the patient due to the serious and/or complex managed problem(s).      Follow up in 6 months with TSH and thyroglobulin prior to appt    Olaf Treviño M.D. Staff Endocrinology

## 2025-04-10 NOTE — ASSESSMENT & PLAN NOTE
--Has been taking tirzepatide 7.5 mg and has lost nearly 30 lbs since 11/2024  --Will refill tirzepatide at 7.5 mg

## 2025-04-10 NOTE — ASSESSMENT & PLAN NOTE
--Patient s/p total thyroidectomy and central/upper mediastinal LN dissection in 7/2022  --With multifocal bilateral PTC, classic variant, largest foci in the right lobe at 5 cm, no ETE or lymphvasc invasion, 17/30 nodes positive in level VI, with NORMAN  --ТАТЬЯНА intermediate risk for recurrence, stage 1  --After discussions with Camila Reyes and Mily she decided against CR ablation due to concern for side effects, and risk/benefits given intermediate recurrence risk, undetectable Tg and no structural findings on neck ultrasound  --I think this is a reasonable approach  --However, her Tg is now slightly detectable at 0.3 (will need to monitor to see if this continues to trend up)  --Reviewed with her that if PTC recurs it generally recurs in the neck  --Neck ultrasound has been negative  --Reviewed with her that the Tg could be slightly detectable due to thyroid tissue in the neck that is microscopic and may not show up on any imaging modalities at this point  --Reviewed with her that CR therapy would certainly be an option if her Tg rises further  --Tg in 6 months  --TSH goal 0.1-0.5 at this time  --Repeat neck ultrasound in 6 months

## 2025-04-10 NOTE — ASSESSMENT & PLAN NOTE
--Patient with post-operative hypothyroidism  --Overall feels better on combination T4/T3 therapy  --TSH is at goal  --Continue Synthroid at100 mcg 6 days per week with a half-tablet (50 mcg) on 1 day per week  --Continue liothyronine 5 mcg once daily  --TSH goal 0.1-0.5  --Repeat TSH in 6 months

## 2025-04-15 ENCOUNTER — PATIENT MESSAGE (OUTPATIENT)
Dept: ENDOCRINOLOGY | Facility: CLINIC | Age: 30
End: 2025-04-15
Payer: COMMERCIAL

## 2025-05-12 DIAGNOSIS — R10.9 ABDOMINAL PAIN, UNSPECIFIED ABDOMINAL LOCATION: ICD-10-CM

## 2025-05-12 RX ORDER — PANTOPRAZOLE SODIUM 40 MG/1
40 TABLET, DELAYED RELEASE ORAL DAILY
Qty: 90 TABLET | Refills: 1 | Status: SHIPPED | OUTPATIENT
Start: 2025-05-12

## 2025-05-12 NOTE — TELEPHONE ENCOUNTER
No care due was identified.  [facility] Embedded Care Due Messages. Reference number: [id number].   5/12/2025 12:57:18 PM CDT

## 2025-05-14 ENCOUNTER — OFFICE VISIT (OUTPATIENT)
Dept: FAMILY MEDICINE | Facility: CLINIC | Age: 30
End: 2025-05-14
Payer: COMMERCIAL

## 2025-05-14 DIAGNOSIS — F33.0 MILD EPISODE OF RECURRENT MAJOR DEPRESSIVE DISORDER: Primary | ICD-10-CM

## 2025-05-14 DIAGNOSIS — E66.9 OBESITY, UNSPECIFIED CLASS, UNSPECIFIED OBESITY TYPE, UNSPECIFIED WHETHER SERIOUS COMORBIDITY PRESENT: ICD-10-CM

## 2025-05-14 PROCEDURE — 1159F MED LIST DOCD IN RCRD: CPT | Mod: CPTII,95,, | Performed by: STUDENT IN AN ORGANIZED HEALTH CARE EDUCATION/TRAINING PROGRAM

## 2025-05-14 PROCEDURE — G2211 COMPLEX E/M VISIT ADD ON: HCPCS | Mod: 95,,, | Performed by: STUDENT IN AN ORGANIZED HEALTH CARE EDUCATION/TRAINING PROGRAM

## 2025-05-14 PROCEDURE — 98006 SYNCH AUDIO-VIDEO EST MOD 30: CPT | Mod: 95,,, | Performed by: STUDENT IN AN ORGANIZED HEALTH CARE EDUCATION/TRAINING PROGRAM

## 2025-05-14 PROCEDURE — 1160F RVW MEDS BY RX/DR IN RCRD: CPT | Mod: CPTII,95,, | Performed by: STUDENT IN AN ORGANIZED HEALTH CARE EDUCATION/TRAINING PROGRAM

## 2025-05-14 RX ORDER — TIRZEPATIDE 7.5 MG/.5ML
7.5 INJECTION, SOLUTION SUBCUTANEOUS
Qty: 6 ML | Refills: 3 | Status: SHIPPED | OUTPATIENT
Start: 2025-05-14 | End: 2025-08-12

## 2025-05-14 RX ORDER — ESCITALOPRAM OXALATE 10 MG/1
15 TABLET ORAL DAILY
Qty: 135 TABLET | Refills: 3 | Status: SHIPPED | OUTPATIENT
Start: 2025-05-14 | End: 2026-05-14

## 2025-05-14 NOTE — PROGRESS NOTES
The patient location is: [place], [place]  The chief complaint leading to consultation is: Depression    Visit type: audiovisual    Face to Face time with patient: 9 minutes  12 minutes of total time spent on the encounter, which includes face to face time and non-face to face time preparing to see the patient (eg, review of tests), Obtaining and/or reviewing separately obtained history, Documenting clinical information in the electronic or other health record, Independently interpreting results (not separately reported) and communicating results to the patient/family/caregiver, or Care coordination (not separately reported).         Each patient to whom he or she provides medical services by telemedicine is:  (1) informed of the relationship between the physician and patient and the respective role of any other health care provider with respect to management of the patient; and (2) notified that he or she may decline to receive medical services by telemedicine and may withdraw from such care at any time.    Notes:     [facility] MEDICINE CLINIC NOTE    Patient Name: Brennan Christine  YOB: 1995    PRESENTING HISTORY     History of Present Illness:  Ms. Brennan Christine is a 30 y.o. female     Has been on lexapro x 1 year.   Starting to hav a little feelings of down.   Sleep initiation issue, taking natural supplement.   Some decreased appetitie due to tirzepatide.   Learning [place], interested in hobbies. Doing this 1-2 hours/day. Learning piano.     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       Obesity- on compounded tirzepatide, feels much better. 7.5mg dose. Was sent zepbound by endocrinologist (aware of thyroid ca hx), but not covered. Discussed compounded formulation.       ROS      OBJECTIVE:   Vital Signs:  There were no vitals filed for this visit.       Physical Exam: Normal, no change.     Physical Exam    ASSESSMENT & PLAN:     Mild episode of recurrent major depressive  disorder  -     EScitalopram oxalate (LEXAPRO) 10 MG tablet; Take 1.5 tablets (15 mg total) by mouth once daily.  Dispense: 135 tablet; Refill: 3  Increase exercise, 30 mins per day outdoor brisk walk  Try upping lexapro to 15mg QD.     Obesity, unspecified class, unspecified obesity type, unspecified whether serious comorbidity present  -     tirzepatide, weight loss, (ZEPBOUND) 7.5 mg/0.5 mL Soln; Inject 7.5 mg into the skin every 7 days.  Dispense: 6 mL; Refill: 3  Continue x 3 months. Long term will need to determine taper/maintenance dosing      Chetan Banuelos  Internal Medicine

## 2025-06-02 ENCOUNTER — PATIENT MESSAGE (OUTPATIENT)
Dept: FAMILY MEDICINE | Facility: CLINIC | Age: 30
End: 2025-06-02
Payer: COMMERCIAL

## 2025-08-26 ENCOUNTER — LAB VISIT (OUTPATIENT)
Dept: LAB | Facility: HOSPITAL | Age: 30
End: 2025-08-26
Attending: SURGERY
Payer: COMMERCIAL

## 2025-09-02 ENCOUNTER — OFFICE VISIT (OUTPATIENT)
Dept: ENDOCRINOLOGY | Facility: CLINIC | Age: 30
End: 2025-09-02
Payer: COMMERCIAL

## 2025-09-02 DIAGNOSIS — E89.0 POST-SURGICAL HYPOTHYROIDISM: Primary | ICD-10-CM

## 2025-09-02 DIAGNOSIS — C73 PAPILLARY THYROID CARCINOMA: ICD-10-CM

## 2025-09-02 DIAGNOSIS — E66.9 OBESITY (BMI 30-39.9): ICD-10-CM

## 2025-09-02 PROCEDURE — G2211 COMPLEX E/M VISIT ADD ON: HCPCS | Mod: 95,,, | Performed by: INTERNAL MEDICINE

## 2025-09-02 PROCEDURE — 98006 SYNCH AUDIO-VIDEO EST MOD 30: CPT | Mod: 95,,, | Performed by: INTERNAL MEDICINE

## 2025-09-02 PROCEDURE — 1160F RVW MEDS BY RX/DR IN RCRD: CPT | Mod: CPTII,95,, | Performed by: INTERNAL MEDICINE

## 2025-09-02 PROCEDURE — 1159F MED LIST DOCD IN RCRD: CPT | Mod: CPTII,95,, | Performed by: INTERNAL MEDICINE

## (undated) DEVICE — CONTAINER SPECIMEN STRL 4OZ

## (undated) DEVICE — SUT 3-0 12-18IN SILK

## (undated) DEVICE — SEE MEDLINE ITEM 157194

## (undated) DEVICE — SUT 2-0 12-18IN SILK

## (undated) DEVICE — BLADE SURG #15 CARBON STEEL

## (undated) DEVICE — ELECTRODE NDL

## (undated) DEVICE — ADHESIVE MASTISOL VIAL 48/BX

## (undated) DEVICE — SUT VICRYL 4-0 RB1 27IN UD

## (undated) DEVICE — CLIP MED TICALL

## (undated) DEVICE — ADHESIVE DERMABOND ADVANCED

## (undated) DEVICE — TRAY SKIN SCRUB WET PREMIUM

## (undated) DEVICE — HEMOSTAT SURGICEL 4X8IN

## (undated) DEVICE — SUT VICRYL PLUS 3-0 SH 18IN

## (undated) DEVICE — SUT VICRYL 3-0 27 SH

## (undated) DEVICE — ELECTRODE REM PLYHSV RETURN 9

## (undated) DEVICE — KIT EVACUATOR FULL PERF 100CC

## (undated) DEVICE — DRAIN CHANNEL ROUND 10FR

## (undated) DEVICE — HOOK LONE STAR BLUNT 12MM

## (undated) DEVICE — GAUZE SPONGE PEANUT STRL

## (undated) DEVICE — CORD BIPOLAR 12 FOOT

## (undated) DEVICE — SPONGE GAUZE 16PLY 4X4

## (undated) DEVICE — TRAY MINOR GEN SURG

## (undated) DEVICE — DRAPE EENT SPLIT STERILE

## (undated) DEVICE — SUT LIGACLIP SMALL XTRA

## (undated) DEVICE — ELECTRODE BLADE INSULATED 1 IN

## (undated) DEVICE — DRAPE HALF SURGICAL 40X58IN

## (undated) DEVICE — GOWN SURGICAL X-LARGE

## (undated) DEVICE — SUT ETHILON 3-0 PS2 18 BLK

## (undated) DEVICE — TOWEL OR DISP STRL BLUE 4/PK

## (undated) DEVICE — NDL HYPO REG 25G X 1 1/2

## (undated) DEVICE — SUT 2/0 30IN SILK BLK BRAI

## (undated) DEVICE — SKINMARKER & RULER REGULAR X-F

## (undated) DEVICE — SUT MONOCRYL 4-0 PS-2